# Patient Record
Sex: FEMALE | Race: OTHER | NOT HISPANIC OR LATINO | URBAN - METROPOLITAN AREA
[De-identification: names, ages, dates, MRNs, and addresses within clinical notes are randomized per-mention and may not be internally consistent; named-entity substitution may affect disease eponyms.]

---

## 2017-04-25 ENCOUNTER — INPATIENT (INPATIENT)
Facility: HOSPITAL | Age: 27
LOS: 2 days | Discharge: HOME | End: 2017-04-28
Attending: SURGERY | Admitting: SURGERY
Payer: SELF-PAY

## 2017-04-25 VITALS
RESPIRATION RATE: 19 BRPM | OXYGEN SATURATION: 100 % | SYSTOLIC BLOOD PRESSURE: 117 MMHG | TEMPERATURE: 98 F | DIASTOLIC BLOOD PRESSURE: 85 MMHG | HEART RATE: 106 BPM

## 2017-04-25 DIAGNOSIS — Z87.59 PERSONAL HISTORY OF OTHER COMPLICATIONS OF PREGNANCY, CHILDBIRTH AND THE PUERPERIUM: Chronic | ICD-10-CM

## 2017-04-25 DIAGNOSIS — K85.90 ACUTE PANCREATITIS WITHOUT NECROSIS OR INFECTION, UNSPECIFIED: ICD-10-CM

## 2017-04-25 LAB
ALBUMIN SERPL ELPH-MCNC: 3.4 G/DL — SIGNIFICANT CHANGE UP (ref 3.3–5)
ALBUMIN SERPL ELPH-MCNC: 4.5 G/DL — SIGNIFICANT CHANGE UP (ref 3.3–5)
ALP SERPL-CCNC: 72 U/L — SIGNIFICANT CHANGE UP (ref 40–120)
ALP SERPL-CCNC: 93 U/L — SIGNIFICANT CHANGE UP (ref 40–120)
ALT FLD-CCNC: 23 U/L — SIGNIFICANT CHANGE UP (ref 4–33)
ALT FLD-CCNC: 31 U/L — SIGNIFICANT CHANGE UP (ref 4–33)
APPEARANCE UR: SIGNIFICANT CHANGE UP
AST SERPL-CCNC: 38 U/L — HIGH (ref 4–32)
AST SERPL-CCNC: 48 U/L — HIGH (ref 4–32)
BASE EXCESS BLDV CALC-SCNC: 3.8 MMOL/L — SIGNIFICANT CHANGE UP
BASOPHILS # BLD AUTO: 0.02 K/UL — SIGNIFICANT CHANGE UP (ref 0–0.2)
BASOPHILS NFR BLD AUTO: 0.2 % — SIGNIFICANT CHANGE UP (ref 0–2)
BILIRUB SERPL-MCNC: 0.5 MG/DL — SIGNIFICANT CHANGE UP (ref 0.2–1.2)
BILIRUB SERPL-MCNC: 0.8 MG/DL — SIGNIFICANT CHANGE UP (ref 0.2–1.2)
BILIRUB UR-MCNC: SIGNIFICANT CHANGE UP
BLOOD GAS VENOUS - CREATININE: 0.57 MG/DL — SIGNIFICANT CHANGE UP (ref 0.5–1.3)
BLOOD UR QL VISUAL: HIGH
BUN SERPL-MCNC: 3 MG/DL — LOW (ref 7–23)
BUN SERPL-MCNC: 5 MG/DL — LOW (ref 7–23)
BUN SERPL-MCNC: 6 MG/DL — LOW (ref 7–23)
CALCIUM SERPL-MCNC: 10.3 MG/DL — SIGNIFICANT CHANGE UP (ref 8.4–10.5)
CALCIUM SERPL-MCNC: 7.6 MG/DL — LOW (ref 8.4–10.5)
CALCIUM SERPL-MCNC: 7.7 MG/DL — LOW (ref 8.4–10.5)
CHLORIDE BLDV-SCNC: 93 MMOL/L — LOW (ref 96–108)
CHLORIDE SERPL-SCNC: 88 MMOL/L — LOW (ref 98–107)
CHLORIDE SERPL-SCNC: 96 MMOL/L — LOW (ref 98–107)
CHLORIDE SERPL-SCNC: 97 MMOL/L — LOW (ref 98–107)
CO2 SERPL-SCNC: 22 MMOL/L — SIGNIFICANT CHANGE UP (ref 22–31)
CO2 SERPL-SCNC: 23 MMOL/L — SIGNIFICANT CHANGE UP (ref 22–31)
CO2 SERPL-SCNC: 24 MMOL/L — SIGNIFICANT CHANGE UP (ref 22–31)
COLOR SPEC: YELLOW — SIGNIFICANT CHANGE UP
CREAT SERPL-MCNC: 0.39 MG/DL — LOW (ref 0.5–1.3)
CREAT SERPL-MCNC: 0.56 MG/DL — SIGNIFICANT CHANGE UP (ref 0.5–1.3)
CREAT SERPL-MCNC: 0.72 MG/DL — SIGNIFICANT CHANGE UP (ref 0.5–1.3)
EOSINOPHIL # BLD AUTO: 0.01 K/UL — SIGNIFICANT CHANGE UP (ref 0–0.5)
EOSINOPHIL NFR BLD AUTO: 0.1 % — SIGNIFICANT CHANGE UP (ref 0–6)
GAS PNL BLDV: 136 MMOL/L — SIGNIFICANT CHANGE UP (ref 136–146)
GLUCOSE BLDV-MCNC: 125 — HIGH (ref 70–99)
GLUCOSE SERPL-MCNC: 113 MG/DL — HIGH (ref 70–99)
GLUCOSE SERPL-MCNC: 124 MG/DL — HIGH (ref 70–99)
GLUCOSE SERPL-MCNC: 90 MG/DL — SIGNIFICANT CHANGE UP (ref 70–99)
GLUCOSE UR-MCNC: SIGNIFICANT CHANGE UP
HCO3 BLDV-SCNC: 26 MMOL/L — SIGNIFICANT CHANGE UP (ref 20–27)
HCT VFR BLD CALC: 31.7 % — LOW (ref 34.5–45)
HCT VFR BLD CALC: 38.5 % — SIGNIFICANT CHANGE UP (ref 34.5–45)
HCT VFR BLDV CALC: 39.2 % — SIGNIFICANT CHANGE UP (ref 34.5–45)
HGB BLD-MCNC: 10.1 G/DL — LOW (ref 11.5–15.5)
HGB BLD-MCNC: 12.6 G/DL — SIGNIFICANT CHANGE UP (ref 11.5–15.5)
HGB BLDV-MCNC: 12.7 G/DL — SIGNIFICANT CHANGE UP (ref 11.5–15.5)
HYALINE CASTS # UR AUTO: >50 — SIGNIFICANT CHANGE UP (ref 0–?)
IMM GRANULOCYTES NFR BLD AUTO: 0.2 % — SIGNIFICANT CHANGE UP (ref 0–1.5)
INR BLD: 1.03 — SIGNIFICANT CHANGE UP (ref 0.88–1.17)
KETONES UR-MCNC: SIGNIFICANT CHANGE UP
LACTATE BLDV-MCNC: 2.2 MMOL/L — HIGH (ref 0.5–2)
LEUKOCYTE ESTERASE UR-ACNC: NEGATIVE — SIGNIFICANT CHANGE UP
LIDOCAIN IGE QN: 281.3 U/L — HIGH (ref 7–60)
LIDOCAIN IGE QN: 513.9 U/L — HIGH (ref 7–60)
LYMPHOCYTES # BLD AUTO: 1.53 K/UL — SIGNIFICANT CHANGE UP (ref 1–3.3)
LYMPHOCYTES # BLD AUTO: 14.7 % — SIGNIFICANT CHANGE UP (ref 13–44)
MAGNESIUM SERPL-MCNC: 1.6 MG/DL — SIGNIFICANT CHANGE UP (ref 1.6–2.6)
MCHC RBC-ENTMCNC: 31.3 PG — SIGNIFICANT CHANGE UP (ref 27–34)
MCHC RBC-ENTMCNC: 31.9 % — LOW (ref 32–36)
MCHC RBC-ENTMCNC: 32.1 PG — SIGNIFICANT CHANGE UP (ref 27–34)
MCHC RBC-ENTMCNC: 32.7 % — SIGNIFICANT CHANGE UP (ref 32–36)
MCV RBC AUTO: 98.1 FL — SIGNIFICANT CHANGE UP (ref 80–100)
MCV RBC AUTO: 98.2 FL — SIGNIFICANT CHANGE UP (ref 80–100)
MONOCYTES # BLD AUTO: 1.03 K/UL — HIGH (ref 0–0.9)
MONOCYTES NFR BLD AUTO: 9.9 % — SIGNIFICANT CHANGE UP (ref 2–14)
MUCOUS THREADS # UR AUTO: SIGNIFICANT CHANGE UP
NEUTROPHILS # BLD AUTO: 7.83 K/UL — HIGH (ref 1.8–7.4)
NEUTROPHILS NFR BLD AUTO: 74.9 % — SIGNIFICANT CHANGE UP (ref 43–77)
NITRITE UR-MCNC: NEGATIVE — SIGNIFICANT CHANGE UP
PCO2 BLDV: 46 MMHG — SIGNIFICANT CHANGE UP (ref 41–51)
PH BLDV: 7.41 PH — SIGNIFICANT CHANGE UP (ref 7.32–7.43)
PH UR: 6.5 — SIGNIFICANT CHANGE UP (ref 4.6–8)
PHOSPHATE SERPL-MCNC: 2.3 MG/DL — LOW (ref 2.5–4.5)
PLATELET # BLD AUTO: 147 K/UL — LOW (ref 150–400)
PLATELET # BLD AUTO: 178 K/UL — SIGNIFICANT CHANGE UP (ref 150–400)
PMV BLD: 11.3 FL — SIGNIFICANT CHANGE UP (ref 7–13)
PMV BLD: 12.1 FL — SIGNIFICANT CHANGE UP (ref 7–13)
PO2 BLDV: < 24 MMHG — LOW (ref 35–40)
POTASSIUM BLDV-SCNC: 2.8 MMOL/L — CRITICAL LOW (ref 3.4–4.5)
POTASSIUM SERPL-MCNC: 2.6 MMOL/L — CRITICAL LOW (ref 3.5–5.3)
POTASSIUM SERPL-MCNC: 3 MMOL/L — LOW (ref 3.5–5.3)
POTASSIUM SERPL-MCNC: 3.3 MMOL/L — LOW (ref 3.5–5.3)
POTASSIUM SERPL-SCNC: 2.6 MMOL/L — CRITICAL LOW (ref 3.5–5.3)
POTASSIUM SERPL-SCNC: 3 MMOL/L — LOW (ref 3.5–5.3)
POTASSIUM SERPL-SCNC: 3.3 MMOL/L — LOW (ref 3.5–5.3)
PROT SERPL-MCNC: 6.5 G/DL — SIGNIFICANT CHANGE UP (ref 6–8.3)
PROT SERPL-MCNC: 8.6 G/DL — HIGH (ref 6–8.3)
PROT UR-MCNC: >600 — SIGNIFICANT CHANGE UP
PROTHROM AB SERPL-ACNC: 11.5 SEC — SIGNIFICANT CHANGE UP (ref 9.8–13.1)
RBC # BLD: 3.23 M/UL — LOW (ref 3.8–5.2)
RBC # BLD: 3.92 M/UL — SIGNIFICANT CHANGE UP (ref 3.8–5.2)
RBC # FLD: 19.1 % — HIGH (ref 10.3–14.5)
RBC # FLD: 19.1 % — HIGH (ref 10.3–14.5)
RBC CASTS # UR COMP ASSIST: SIGNIFICANT CHANGE UP (ref 0–?)
SAO2 % BLDV: 33.8 % — LOW (ref 60–85)
SODIUM SERPL-SCNC: 136 MMOL/L — SIGNIFICANT CHANGE UP (ref 135–145)
SODIUM SERPL-SCNC: 136 MMOL/L — SIGNIFICANT CHANGE UP (ref 135–145)
SODIUM SERPL-SCNC: 138 MMOL/L — SIGNIFICANT CHANGE UP (ref 135–145)
SP GR SPEC: 1.04 — HIGH (ref 1–1.03)
SQUAMOUS # UR AUTO: SIGNIFICANT CHANGE UP
UROBILINOGEN FLD QL: 8 E.U. — HIGH (ref 0.1–0.2)
WBC # BLD: 10.44 K/UL — SIGNIFICANT CHANGE UP (ref 3.8–10.5)
WBC # BLD: 9.58 K/UL — SIGNIFICANT CHANGE UP (ref 3.8–10.5)
WBC # FLD AUTO: 10.44 K/UL — SIGNIFICANT CHANGE UP (ref 3.8–10.5)
WBC # FLD AUTO: 9.58 K/UL — SIGNIFICANT CHANGE UP (ref 3.8–10.5)
WBC UR QL: HIGH (ref 0–?)

## 2017-04-25 PROCEDURE — 74177 CT ABD & PELVIS W/CONTRAST: CPT | Mod: 26

## 2017-04-25 PROCEDURE — 99253 IP/OBS CNSLTJ NEW/EST LOW 45: CPT

## 2017-04-25 RX ORDER — ENOXAPARIN SODIUM 100 MG/ML
78 INJECTION SUBCUTANEOUS DAILY
Qty: 0 | Refills: 0 | Status: DISCONTINUED | OUTPATIENT
Start: 2017-04-25 | End: 2017-04-25

## 2017-04-25 RX ORDER — HYDROMORPHONE HYDROCHLORIDE 2 MG/ML
1 INJECTION INTRAMUSCULAR; INTRAVENOUS; SUBCUTANEOUS ONCE
Qty: 0 | Refills: 0 | Status: DISCONTINUED | OUTPATIENT
Start: 2017-04-25 | End: 2017-04-25

## 2017-04-25 RX ORDER — OXYCODONE HYDROCHLORIDE 5 MG/1
10 TABLET ORAL EVERY 4 HOURS
Qty: 0 | Refills: 0 | Status: DISCONTINUED | OUTPATIENT
Start: 2017-04-25 | End: 2017-04-28

## 2017-04-25 RX ORDER — MAGNESIUM SULFATE 500 MG/ML
2 VIAL (ML) INJECTION ONCE
Qty: 0 | Refills: 0 | Status: COMPLETED | OUTPATIENT
Start: 2017-04-25 | End: 2017-04-25

## 2017-04-25 RX ORDER — HYDROMORPHONE HYDROCHLORIDE 2 MG/ML
0.5 INJECTION INTRAMUSCULAR; INTRAVENOUS; SUBCUTANEOUS EVERY 6 HOURS
Qty: 0 | Refills: 0 | Status: DISCONTINUED | OUTPATIENT
Start: 2017-04-25 | End: 2017-04-27

## 2017-04-25 RX ORDER — SODIUM CHLORIDE 9 MG/ML
1000 INJECTION INTRAMUSCULAR; INTRAVENOUS; SUBCUTANEOUS ONCE
Qty: 0 | Refills: 0 | Status: COMPLETED | OUTPATIENT
Start: 2017-04-25 | End: 2017-04-25

## 2017-04-25 RX ORDER — ENOXAPARIN SODIUM 100 MG/ML
75 INJECTION SUBCUTANEOUS DAILY
Qty: 0 | Refills: 0 | Status: DISCONTINUED | OUTPATIENT
Start: 2017-04-25 | End: 2017-04-28

## 2017-04-25 RX ORDER — OXYCODONE HYDROCHLORIDE 5 MG/1
10 TABLET ORAL EVERY 4 HOURS
Qty: 0 | Refills: 0 | Status: DISCONTINUED | OUTPATIENT
Start: 2017-04-25 | End: 2017-04-25

## 2017-04-25 RX ORDER — POTASSIUM PHOSPHATE, MONOBASIC POTASSIUM PHOSPHATE, DIBASIC 236; 224 MG/ML; MG/ML
15 INJECTION, SOLUTION INTRAVENOUS ONCE
Qty: 0 | Refills: 0 | Status: COMPLETED | OUTPATIENT
Start: 2017-04-25 | End: 2017-04-25

## 2017-04-25 RX ORDER — POTASSIUM CHLORIDE 20 MEQ
10 PACKET (EA) ORAL
Qty: 0 | Refills: 0 | Status: COMPLETED | OUTPATIENT
Start: 2017-04-25 | End: 2017-04-25

## 2017-04-25 RX ORDER — SODIUM CHLORIDE 9 MG/ML
1000 INJECTION, SOLUTION INTRAVENOUS
Qty: 0 | Refills: 0 | Status: DISCONTINUED | OUTPATIENT
Start: 2017-04-25 | End: 2017-04-27

## 2017-04-25 RX ORDER — OXYCODONE HYDROCHLORIDE 5 MG/1
5 TABLET ORAL EVERY 4 HOURS
Qty: 0 | Refills: 0 | Status: DISCONTINUED | OUTPATIENT
Start: 2017-04-25 | End: 2017-04-25

## 2017-04-25 RX ORDER — OXYCODONE HYDROCHLORIDE 5 MG/1
5 TABLET ORAL EVERY 4 HOURS
Qty: 0 | Refills: 0 | Status: DISCONTINUED | OUTPATIENT
Start: 2017-04-25 | End: 2017-04-28

## 2017-04-25 RX ORDER — ONDANSETRON 8 MG/1
4 TABLET, FILM COATED ORAL ONCE
Qty: 0 | Refills: 0 | Status: COMPLETED | OUTPATIENT
Start: 2017-04-25 | End: 2017-04-25

## 2017-04-25 RX ORDER — ENOXAPARIN SODIUM 100 MG/ML
75 INJECTION SUBCUTANEOUS DAILY
Qty: 0 | Refills: 0 | Status: DISCONTINUED | OUTPATIENT
Start: 2017-04-25 | End: 2017-04-25

## 2017-04-25 RX ADMIN — HYDROMORPHONE HYDROCHLORIDE 1 MILLIGRAM(S): 2 INJECTION INTRAMUSCULAR; INTRAVENOUS; SUBCUTANEOUS at 00:00

## 2017-04-25 RX ADMIN — HYDROMORPHONE HYDROCHLORIDE 1 MILLIGRAM(S): 2 INJECTION INTRAMUSCULAR; INTRAVENOUS; SUBCUTANEOUS at 02:51

## 2017-04-25 RX ADMIN — HYDROMORPHONE HYDROCHLORIDE 1 MILLIGRAM(S): 2 INJECTION INTRAMUSCULAR; INTRAVENOUS; SUBCUTANEOUS at 03:05

## 2017-04-25 RX ADMIN — OXYCODONE HYDROCHLORIDE 5 MILLIGRAM(S): 5 TABLET ORAL at 15:45

## 2017-04-25 RX ADMIN — OXYCODONE HYDROCHLORIDE 5 MILLIGRAM(S): 5 TABLET ORAL at 15:21

## 2017-04-25 RX ADMIN — SODIUM CHLORIDE 1000 MILLILITER(S): 9 INJECTION INTRAMUSCULAR; INTRAVENOUS; SUBCUTANEOUS at 03:05

## 2017-04-25 RX ADMIN — HYDROMORPHONE HYDROCHLORIDE 0.5 MILLIGRAM(S): 2 INJECTION INTRAMUSCULAR; INTRAVENOUS; SUBCUTANEOUS at 12:27

## 2017-04-25 RX ADMIN — Medication 100 MILLIEQUIVALENT(S): at 12:12

## 2017-04-25 RX ADMIN — Medication 100 MILLIEQUIVALENT(S): at 21:19

## 2017-04-25 RX ADMIN — ONDANSETRON 4 MILLIGRAM(S): 8 TABLET, FILM COATED ORAL at 02:51

## 2017-04-25 RX ADMIN — ENOXAPARIN SODIUM 75 MILLIGRAM(S): 100 INJECTION SUBCUTANEOUS at 12:25

## 2017-04-25 RX ADMIN — SODIUM CHLORIDE 1000 MILLILITER(S): 9 INJECTION INTRAMUSCULAR; INTRAVENOUS; SUBCUTANEOUS at 03:52

## 2017-04-25 RX ADMIN — HYDROMORPHONE HYDROCHLORIDE 1 MILLIGRAM(S): 2 INJECTION INTRAMUSCULAR; INTRAVENOUS; SUBCUTANEOUS at 06:51

## 2017-04-25 RX ADMIN — Medication 100 MILLIEQUIVALENT(S): at 06:51

## 2017-04-25 RX ADMIN — Medication 100 MILLIEQUIVALENT(S): at 16:49

## 2017-04-25 RX ADMIN — OXYCODONE HYDROCHLORIDE 10 MILLIGRAM(S): 5 TABLET ORAL at 19:10

## 2017-04-25 RX ADMIN — HYDROMORPHONE HYDROCHLORIDE 0.5 MILLIGRAM(S): 2 INJECTION INTRAMUSCULAR; INTRAVENOUS; SUBCUTANEOUS at 12:13

## 2017-04-25 RX ADMIN — Medication 100 MILLIEQUIVALENT(S): at 23:52

## 2017-04-25 RX ADMIN — Medication 100 MILLIEQUIVALENT(S): at 22:34

## 2017-04-25 RX ADMIN — Medication 100 MILLIEQUIVALENT(S): at 04:51

## 2017-04-25 RX ADMIN — POTASSIUM PHOSPHATE, MONOBASIC POTASSIUM PHOSPHATE, DIBASIC 62.5 MILLIMOLE(S): 236; 224 INJECTION, SOLUTION INTRAVENOUS at 11:23

## 2017-04-25 RX ADMIN — Medication 50 GRAM(S): at 09:51

## 2017-04-25 RX ADMIN — Medication 100 MILLIEQUIVALENT(S): at 15:09

## 2017-04-25 RX ADMIN — OXYCODONE HYDROCHLORIDE 10 MILLIGRAM(S): 5 TABLET ORAL at 18:40

## 2017-04-25 RX ADMIN — Medication 100 MILLIEQUIVALENT(S): at 03:22

## 2017-04-25 RX ADMIN — SODIUM CHLORIDE 125 MILLILITER(S): 9 INJECTION, SOLUTION INTRAVENOUS at 11:25

## 2017-04-25 NOTE — ED PROVIDER NOTE - PMH
Alcohol abuse, daily use    Ectopic pregnancy Alcohol abuse, daily use    Ectopic pregnancy    Pancreatitis

## 2017-04-25 NOTE — ED PROVIDER NOTE - OBJECTIVE STATEMENT
att27 y/o f with h/o pancreatitis , h/o etoh abuse, ectopic pregnancy, kidney stones, presents with back pain x 2 days and then a separate abd pain, nausea/vomiting since yesterday. Feels like her pancreatitis in the past. No dysuria. R breast discomfort 4 days ago. Pt had + etoh this week, 3 days straight. LMP 3 weeks ago. No fever, no diarrhea. No cp, no sob. No dysuria. h/o etoh in the past, over 3 yrs ago. att25 y/o f with h/o pancreatitis , h/o etoh abuse, ectopic pregnancy, kidney stones, presents with back pain x 2 days and then a separate abd pain, nausea/vomiting since yesterday. Feels like her pancreatitis in the past. No dysuria. R breast discomfort 4 days ago. Pt had + etoh this week, 3 days straight. LMP 3 weeks ago. No fever, no diarrhea. No cp, no sob. No dysuria. h/o etoh in the past, over 3 yrs ago.   Reviewed chart, necrotizing pancreatitis with peripancreatic collections on rp ct.

## 2017-04-25 NOTE — ED ADULT NURSE REASSESSMENT NOTE - NS ED NURSE REASSESS COMMENT FT1
Pt remains A&Ox3 c/o generalized abdominal pain- pt medicated per MD orders. Denies dizziness, lightheadedness, n/v/d, fever, chills, SOB. Pt appears comfortably in stretcher, respirations even and unlabored, nad noted at this time. Awaiting surgery consult. Will monitor patient closely.

## 2017-04-25 NOTE — ED ADULT NURSE REASSESSMENT NOTE - NS ED NURSE REASSESS COMMENT FT1
Pt remains A&Ox3, denies abdominal pain/discomfort, n/v/d, dizziness, lightheadedness, fever, chills at this time. Second 20G IV placed in L AC, repeat blood work drawn and sent and CIWA score 0 as noted in flow sheet. Pt states last drink was Sunday at 5am, states she does not drink daily and does not go through withdrawal. Pt appears comfortably in stretcher, respirations even and unlabored, nad noted at this time. Will monitor patient closely.

## 2017-04-25 NOTE — ED PROVIDER NOTE - MEDICAL DECISION MAKING DETAILS
att25 y/o f with h/o pancreatitis , h/o etoh abuse, ectopic pregnancy, kidney stones, presents with back pain x 2 days and then a separate abd pain, nausea/vomiting since yesterday. Feels like her pancreatitis in the past. No dysuria. R breast discomfort 4 days ago. Pt had + etoh this week, 3 days straight. LMP 3 weeks ago. No fever, no diarrhea. No cp, no sob. No dysuria. h/o etoh in the past, over 3 yrs ago. Reviewed chart, necrotizing pancreatitis with peripancreatic collections on rp ct. Exam as above. Plan for pain control, ru pancreatitis, ct with h/o and reassessment. Likely admission. Likely etoh related.

## 2017-04-25 NOTE — ED ADULT NURSE NOTE - OBJECTIVE STATEMENT
Pt received to room1 A&Ox3 c/o back pain X2days, generalized abdominal pain with n/v since yesterday and R breast pain Xweek. Pt states back pain "all along my spinal cord." Arrives with an icy hot patch on back but states no relief with patch. Pt states she started having abdominal pain since yesterday morning accompanied with n/v X10, unable to tolerate anything PO. Cannot identify any alleviating or worsening factors for back or abdominal pain. Last BM thee days ago- states she usually has a BM daily. Has been taking Motrin at home for pain with no relief. Denies urinary symptoms, bloody BM/emesis, chest pain, dizziness, fever, chills, SOB at this time. PMH pancreatitis. 20G IV placed in R AC, labs sent and awaiting MD rivera. Will monitor patient closely.

## 2017-04-25 NOTE — H&P ADULT. - HISTORY OF PRESENT ILLNESS
26F with history of ectopic pregnancy s/p R salpingectomy , ETOH abuse, necrotizing pancreatitis in  (admitted to Dr. Oscar) presenting with back pain and abdominal pain. Back pain began 4 days prior to admission, after which the patient was heavily drinking for 2 friends' birthdays. Pain continued and radiated to the abdomen today, at which point it felt like the pain she previously had with pancreatitis. She had nausea/emesis this morning. No fevers/chills.    In  patient was admitted from -  first to the medical service and then transferred to the surgical service under Dr. Oscar for treatment of necrotizing pancreatitis. She did not require surgery during that admission. She was found to have bilateral pleural effusions and was intubated for respiratory distress from 2013-2013. She received enteric feeds via a nasoduodenal feeding tube and was later advanced to a regular diet and discharged home.    The patient has a medical/surgical history of an ectopic pregnancy in  s/p right salpingectomy. She takes Aleve as needed for abdominal pain during her period. LMP 3 weeks ago, irregular (she has always had irregular periods but has never been on medications such as OCP for it). She has no known allergies and no known medical problems that run in her family.    The patient lives at home with her mother and son who was [place] . Her father  after which she has been a heavy drinker, with multiple attempts to quit. She had previous symptoms of alcohol withdrawal during her last admission. She currently drinks 3-4 drinks containing vodka every week. She occasionally smokes marijuana but denies cigarette smoking.

## 2017-04-25 NOTE — ED ADULT TRIAGE NOTE - CHIEF COMPLAINT QUOTE
Pt c/o abdominal pain and back pain starting yesterday.  Pt endorses nausea/vomiting, dry heaving in triage.  Denies any urinary symptoms.  Pt reports hx of pancreatitis, states feels similar.  Pt also c/o R breast pain starting a few days ago.  PMHx:  pancreatitis

## 2017-04-25 NOTE — ED PROVIDER NOTE - PROGRESS NOTE DETAILS
Pt states symptoms improved. CT with cannot exclude necrosis, small peripancreatic fluid collections near the tail of the pancreas. thrombosed splenic vein, in past, pt states she was on heparin and d/c on heparin for 2 days and no further AC. C/s surgery.   Discussed the need for cessation of etoh use, severity of disease. Pt states she understands.

## 2017-04-25 NOTE — H&P ADULT. - ASSESSMENT
26F with alcoholic pancreatitis c/b peripancreatic fluid collections and splenic vein thrombosis  -Admit to B team surgery under Dr. Ward  -NPO  -IVF with multivitamins given ETOH abuse  -abdominal exams  -[facility] protocol to monitor for alcohol withdrawal signs/symptoms  -anticoagulation for splenic vein thrombosis  -social work consult for alcohol abuse

## 2017-04-25 NOTE — ED PROVIDER NOTE - CONSTITUTIONAL, MLM
normal... nontoxic. uncomfortable secondary to pain. well nourished, awake, alert, oriented to person, place, time/situation

## 2017-04-26 LAB
ALBUMIN SERPL ELPH-MCNC: 3.2 G/DL — LOW (ref 3.3–5)
ALP SERPL-CCNC: 68 U/L — SIGNIFICANT CHANGE UP (ref 40–120)
ALT FLD-CCNC: 18 U/L — SIGNIFICANT CHANGE UP (ref 4–33)
APTT BLD: 25.5 SEC — LOW (ref 27.5–37.4)
AST SERPL-CCNC: 29 U/L — SIGNIFICANT CHANGE UP (ref 4–32)
BILIRUB SERPL-MCNC: 0.4 MG/DL — SIGNIFICANT CHANGE UP (ref 0.2–1.2)
BUN SERPL-MCNC: 2 MG/DL — LOW (ref 7–23)
BUN SERPL-MCNC: 3 MG/DL — LOW (ref 7–23)
BUN SERPL-MCNC: 3 MG/DL — LOW (ref 7–23)
CALCIUM SERPL-MCNC: 7.6 MG/DL — LOW (ref 8.4–10.5)
CALCIUM SERPL-MCNC: 8.2 MG/DL — LOW (ref 8.4–10.5)
CALCIUM SERPL-MCNC: 8.6 MG/DL — SIGNIFICANT CHANGE UP (ref 8.4–10.5)
CHLORIDE SERPL-SCNC: 99 MMOL/L — SIGNIFICANT CHANGE UP (ref 98–107)
CO2 SERPL-SCNC: 23 MMOL/L — SIGNIFICANT CHANGE UP (ref 22–31)
CO2 SERPL-SCNC: 23 MMOL/L — SIGNIFICANT CHANGE UP (ref 22–31)
CO2 SERPL-SCNC: 28 MMOL/L — SIGNIFICANT CHANGE UP (ref 22–31)
CREAT SERPL-MCNC: 0.39 MG/DL — LOW (ref 0.5–1.3)
CREAT SERPL-MCNC: 0.42 MG/DL — LOW (ref 0.5–1.3)
CREAT SERPL-MCNC: 0.51 MG/DL — SIGNIFICANT CHANGE UP (ref 0.5–1.3)
GLUCOSE SERPL-MCNC: 113 MG/DL — HIGH (ref 70–99)
GLUCOSE SERPL-MCNC: 79 MG/DL — SIGNIFICANT CHANGE UP (ref 70–99)
GLUCOSE SERPL-MCNC: 88 MG/DL — SIGNIFICANT CHANGE UP (ref 70–99)
HCT VFR BLD CALC: 32.7 % — LOW (ref 34.5–45)
HGB BLD-MCNC: 10.4 G/DL — LOW (ref 11.5–15.5)
INR BLD: 1.03 — SIGNIFICANT CHANGE UP (ref 0.88–1.17)
LIDOCAIN IGE QN: 98.8 U/L — HIGH (ref 7–60)
MAGNESIUM SERPL-MCNC: 1.8 MG/DL — SIGNIFICANT CHANGE UP (ref 1.6–2.6)
MAGNESIUM SERPL-MCNC: 2.1 MG/DL — SIGNIFICANT CHANGE UP (ref 1.6–2.6)
MCHC RBC-ENTMCNC: 31.7 PG — SIGNIFICANT CHANGE UP (ref 27–34)
MCHC RBC-ENTMCNC: 31.8 % — LOW (ref 32–36)
MCV RBC AUTO: 99.7 FL — SIGNIFICANT CHANGE UP (ref 80–100)
PHOSPHATE SERPL-MCNC: 1.4 MG/DL — LOW (ref 2.5–4.5)
PHOSPHATE SERPL-MCNC: 1.7 MG/DL — LOW (ref 2.5–4.5)
PLATELET # BLD AUTO: 167 K/UL — SIGNIFICANT CHANGE UP (ref 150–400)
PMV BLD: 11.5 FL — SIGNIFICANT CHANGE UP (ref 7–13)
POTASSIUM SERPL-MCNC: 3 MMOL/L — LOW (ref 3.5–5.3)
POTASSIUM SERPL-MCNC: 3.1 MMOL/L — LOW (ref 3.5–5.3)
POTASSIUM SERPL-MCNC: 3.5 MMOL/L — SIGNIFICANT CHANGE UP (ref 3.5–5.3)
POTASSIUM SERPL-SCNC: 3 MMOL/L — LOW (ref 3.5–5.3)
POTASSIUM SERPL-SCNC: 3.1 MMOL/L — LOW (ref 3.5–5.3)
POTASSIUM SERPL-SCNC: 3.5 MMOL/L — SIGNIFICANT CHANGE UP (ref 3.5–5.3)
PROT SERPL-MCNC: 6.5 G/DL — SIGNIFICANT CHANGE UP (ref 6–8.3)
PROTHROM AB SERPL-ACNC: 11.5 SEC — SIGNIFICANT CHANGE UP (ref 9.8–13.1)
RBC # BLD: 3.28 M/UL — LOW (ref 3.8–5.2)
RBC # FLD: 19.2 % — HIGH (ref 10.3–14.5)
SODIUM SERPL-SCNC: 138 MMOL/L — SIGNIFICANT CHANGE UP (ref 135–145)
SODIUM SERPL-SCNC: 138 MMOL/L — SIGNIFICANT CHANGE UP (ref 135–145)
SODIUM SERPL-SCNC: 139 MMOL/L — SIGNIFICANT CHANGE UP (ref 135–145)
WBC # BLD: 8.14 K/UL — SIGNIFICANT CHANGE UP (ref 3.8–10.5)
WBC # FLD AUTO: 8.14 K/UL — SIGNIFICANT CHANGE UP (ref 3.8–10.5)

## 2017-04-26 PROCEDURE — 99232 SBSQ HOSP IP/OBS MODERATE 35: CPT

## 2017-04-26 RX ORDER — POTASSIUM CHLORIDE 20 MEQ
10 PACKET (EA) ORAL
Qty: 0 | Refills: 0 | Status: COMPLETED | OUTPATIENT
Start: 2017-04-26 | End: 2017-04-26

## 2017-04-26 RX ORDER — POTASSIUM PHOSPHATE, MONOBASIC POTASSIUM PHOSPHATE, DIBASIC 236; 224 MG/ML; MG/ML
15 INJECTION, SOLUTION INTRAVENOUS ONCE
Qty: 0 | Refills: 0 | Status: COMPLETED | OUTPATIENT
Start: 2017-04-26 | End: 2017-04-26

## 2017-04-26 RX ADMIN — OXYCODONE HYDROCHLORIDE 10 MILLIGRAM(S): 5 TABLET ORAL at 19:22

## 2017-04-26 RX ADMIN — OXYCODONE HYDROCHLORIDE 10 MILLIGRAM(S): 5 TABLET ORAL at 01:45

## 2017-04-26 RX ADMIN — OXYCODONE HYDROCHLORIDE 10 MILLIGRAM(S): 5 TABLET ORAL at 19:44

## 2017-04-26 RX ADMIN — OXYCODONE HYDROCHLORIDE 10 MILLIGRAM(S): 5 TABLET ORAL at 02:15

## 2017-04-26 RX ADMIN — OXYCODONE HYDROCHLORIDE 10 MILLIGRAM(S): 5 TABLET ORAL at 11:15

## 2017-04-26 RX ADMIN — Medication 100 MILLIEQUIVALENT(S): at 10:51

## 2017-04-26 RX ADMIN — POTASSIUM PHOSPHATE, MONOBASIC POTASSIUM PHOSPHATE, DIBASIC 62.5 MILLIMOLE(S): 236; 224 INJECTION, SOLUTION INTRAVENOUS at 12:16

## 2017-04-26 RX ADMIN — Medication 100 MILLIEQUIVALENT(S): at 16:45

## 2017-04-26 RX ADMIN — Medication 100 MILLIEQUIVALENT(S): at 13:03

## 2017-04-26 RX ADMIN — OXYCODONE HYDROCHLORIDE 10 MILLIGRAM(S): 5 TABLET ORAL at 10:50

## 2017-04-26 RX ADMIN — ENOXAPARIN SODIUM 75 MILLIGRAM(S): 100 INJECTION SUBCUTANEOUS at 12:16

## 2017-04-27 ENCOUNTER — TRANSCRIPTION ENCOUNTER (OUTPATIENT)
Age: 27
End: 2017-04-27

## 2017-04-27 LAB
ALBUMIN SERPL ELPH-MCNC: 3 G/DL — LOW (ref 3.3–5)
ALP SERPL-CCNC: 63 U/L — SIGNIFICANT CHANGE UP (ref 40–120)
ALT FLD-CCNC: 16 U/L — SIGNIFICANT CHANGE UP (ref 4–33)
AMYLASE P1 CFR SERPL: 79 U/L — SIGNIFICANT CHANGE UP (ref 25–125)
AST SERPL-CCNC: 27 U/L — SIGNIFICANT CHANGE UP (ref 4–32)
BILIRUB SERPL-MCNC: 0.3 MG/DL — SIGNIFICANT CHANGE UP (ref 0.2–1.2)
BUN SERPL-MCNC: 2 MG/DL — LOW (ref 7–23)
CALCIUM SERPL-MCNC: 8.7 MG/DL — SIGNIFICANT CHANGE UP (ref 8.4–10.5)
CHLORIDE SERPL-SCNC: 104 MMOL/L — SIGNIFICANT CHANGE UP (ref 98–107)
CO2 SERPL-SCNC: 26 MMOL/L — SIGNIFICANT CHANGE UP (ref 22–31)
CREAT SERPL-MCNC: 0.43 MG/DL — LOW (ref 0.5–1.3)
GLUCOSE SERPL-MCNC: 111 MG/DL — HIGH (ref 70–99)
HCT VFR BLD CALC: 34.1 % — LOW (ref 34.5–45)
HGB BLD-MCNC: 10.8 G/DL — LOW (ref 11.5–15.5)
LIDOCAIN IGE QN: 121.2 U/L — HIGH (ref 7–60)
MCHC RBC-ENTMCNC: 31.7 % — LOW (ref 32–36)
MCHC RBC-ENTMCNC: 31.8 PG — SIGNIFICANT CHANGE UP (ref 27–34)
MCV RBC AUTO: 100.3 FL — HIGH (ref 80–100)
PLATELET # BLD AUTO: 196 K/UL — SIGNIFICANT CHANGE UP (ref 150–400)
PMV BLD: 11.3 FL — SIGNIFICANT CHANGE UP (ref 7–13)
POTASSIUM SERPL-MCNC: 4.5 MMOL/L — SIGNIFICANT CHANGE UP (ref 3.5–5.3)
POTASSIUM SERPL-SCNC: 4.5 MMOL/L — SIGNIFICANT CHANGE UP (ref 3.5–5.3)
PROT SERPL-MCNC: 6.2 G/DL — SIGNIFICANT CHANGE UP (ref 6–8.3)
RBC # BLD: 3.4 M/UL — LOW (ref 3.8–5.2)
RBC # FLD: 19.1 % — HIGH (ref 10.3–14.5)
SODIUM SERPL-SCNC: 140 MMOL/L — SIGNIFICANT CHANGE UP (ref 135–145)
WBC # BLD: 6.34 K/UL — SIGNIFICANT CHANGE UP (ref 3.8–10.5)
WBC # FLD AUTO: 6.34 K/UL — SIGNIFICANT CHANGE UP (ref 3.8–10.5)

## 2017-04-27 RX ORDER — OXYCODONE HYDROCHLORIDE 5 MG/1
1 TABLET ORAL
Qty: 20 | Refills: 0 | OUTPATIENT
Start: 2017-04-27

## 2017-04-27 RX ORDER — APIXABAN 2.5 MG/1
1 TABLET, FILM COATED ORAL
Qty: 60 | Refills: 0 | OUTPATIENT
Start: 2017-04-27 | End: 2017-05-27

## 2017-04-27 RX ORDER — OXYCODONE HYDROCHLORIDE 5 MG/1
1 TABLET ORAL
Qty: 0 | Refills: 0 | COMMUNITY
Start: 2017-04-27

## 2017-04-27 RX ORDER — ENOXAPARIN SODIUM 100 MG/ML
75 INJECTION SUBCUTANEOUS
Qty: 525 | Refills: 0 | OUTPATIENT
Start: 2017-04-27 | End: 2017-05-04

## 2017-04-27 RX ORDER — WARFARIN SODIUM 2.5 MG/1
5 TABLET ORAL ONCE
Qty: 0 | Refills: 0 | Status: COMPLETED | OUTPATIENT
Start: 2017-04-27 | End: 2017-04-27

## 2017-04-27 RX ORDER — POTASSIUM PHOSPHATE, MONOBASIC POTASSIUM PHOSPHATE, DIBASIC 236; 224 MG/ML; MG/ML
15 INJECTION, SOLUTION INTRAVENOUS ONCE
Qty: 0 | Refills: 0 | Status: COMPLETED | OUTPATIENT
Start: 2017-04-27 | End: 2017-04-27

## 2017-04-27 RX ORDER — POTASSIUM CHLORIDE 20 MEQ
10 PACKET (EA) ORAL
Qty: 0 | Refills: 0 | Status: COMPLETED | OUTPATIENT
Start: 2017-04-27 | End: 2017-04-27

## 2017-04-27 RX ORDER — POTASSIUM CHLORIDE 20 MEQ
40 PACKET (EA) ORAL ONCE
Qty: 0 | Refills: 0 | Status: COMPLETED | OUTPATIENT
Start: 2017-04-27 | End: 2017-04-27

## 2017-04-27 RX ORDER — MAGNESIUM SULFATE 500 MG/ML
2 VIAL (ML) INJECTION ONCE
Qty: 0 | Refills: 0 | Status: COMPLETED | OUTPATIENT
Start: 2017-04-27 | End: 2017-04-27

## 2017-04-27 RX ORDER — WARFARIN SODIUM 2.5 MG/1
1 TABLET ORAL
Qty: 0 | Refills: 0 | COMMUNITY
Start: 2017-04-27

## 2017-04-27 RX ADMIN — OXYCODONE HYDROCHLORIDE 10 MILLIGRAM(S): 5 TABLET ORAL at 01:15

## 2017-04-27 RX ADMIN — Medication 40 MILLIEQUIVALENT(S): at 00:58

## 2017-04-27 RX ADMIN — OXYCODONE HYDROCHLORIDE 10 MILLIGRAM(S): 5 TABLET ORAL at 11:50

## 2017-04-27 RX ADMIN — Medication 100 MILLIEQUIVALENT(S): at 02:07

## 2017-04-27 RX ADMIN — OXYCODONE HYDROCHLORIDE 10 MILLIGRAM(S): 5 TABLET ORAL at 22:39

## 2017-04-27 RX ADMIN — ENOXAPARIN SODIUM 75 MILLIGRAM(S): 100 INJECTION SUBCUTANEOUS at 11:20

## 2017-04-27 RX ADMIN — Medication 100 MILLIEQUIVALENT(S): at 03:07

## 2017-04-27 RX ADMIN — OXYCODONE HYDROCHLORIDE 10 MILLIGRAM(S): 5 TABLET ORAL at 11:20

## 2017-04-27 RX ADMIN — OXYCODONE HYDROCHLORIDE 10 MILLIGRAM(S): 5 TABLET ORAL at 01:45

## 2017-04-27 RX ADMIN — Medication 50 GRAM(S): at 00:58

## 2017-04-27 RX ADMIN — OXYCODONE HYDROCHLORIDE 10 MILLIGRAM(S): 5 TABLET ORAL at 23:19

## 2017-04-27 RX ADMIN — WARFARIN SODIUM 5 MILLIGRAM(S): 2.5 TABLET ORAL at 22:35

## 2017-04-27 RX ADMIN — POTASSIUM PHOSPHATE, MONOBASIC POTASSIUM PHOSPHATE, DIBASIC 62.5 MILLIMOLE(S): 236; 224 INJECTION, SOLUTION INTRAVENOUS at 02:00

## 2017-04-27 RX ADMIN — Medication 100 MILLIEQUIVALENT(S): at 00:58

## 2017-04-27 NOTE — DISCHARGE NOTE ADULT - CARE PROVIDER_API CALL
Jeferson Oscar), Surgery  [address] [place] [address]  Phone: [phone]  Fax: [phone] Cem Ward), Surgery  [address] [place] [address]  Phone: [phone]  Fax: [phone]

## 2017-04-27 NOTE — DISCHARGE NOTE ADULT - CARE PLAN
Principal Discharge DX:	Acute pancreatitis  Goal:	Obtain proper follow up  Instructions for follow-up, activity and diet:	Please follow up with Dr. Ward in two weeks. Refrain from drinking alcohol.   Return to the hospital if you notice high fevers, abdominal pain, or food intolerance. Principal Discharge DX:	Acute pancreatitis  Goal:	Obtain proper follow up  Instructions for follow-up, activity and diet:	Please follow up with Dr. Ward in two weeks. Refrain from drinking alcohol.   Return to the hospital if you notice high fevers, abdominal pain, or food intolerance.  Secondary Diagnosis:	Splenic vein thrombosis  Goal:	You were started on anticoagulation in the hospital  Instructions for follow-up, activity and diet:	Please follow up with Primary medical doctor Dr. Augustin Sanabria for INR checks. You are at high risk for bleeding/brusing.  If your notice easy bleeding or bruising, please follow up with your primary medical doctor. Principal Discharge DX:	Acute pancreatitis  Goal:	Obtain proper follow up  Instructions for follow-up, activity and diet:	Please follow up with Dr. Ward in two weeks. Refrain from drinking alcohol.   Return to the hospital if you notice high fevers, abdominal pain, or food intolerance.  Secondary Diagnosis:	Splenic vein thrombosis  Goal:	You were started on anticoagulation in the hospital  Instructions for follow-up, activity and diet:	Please follow up with Primary medical doctor Dr. Augustin Sanabria for INR checks.  Please follow up with Dr. Sanbaria tomorrow 8/29/17 for INR check and for further instructions for your lovenox dosing.  You have been given a prescription for 3 days for Lovenox and Coumadin after which point you will need a new prescription on Monday for further dosing.  You are at high risk for bleeding/brusing.  If your notice easy bleeding or bruising, please follow up with your primary medical doctor.  If you have any major bleeding please go to your nearest emergengy room.  You had a few drops of blood from your right ear while in the hospital.  You were seen by the Ear Nose And Throat team who recommended Ofloxacin drops.  You did not want a prescription for the drops at this time and said that you will follow up with your Primary Care Doctor tomorrow in the office about this. Principal Discharge DX:	Acute pancreatitis  Goal:	Obtain proper follow up  Instructions for follow-up, activity and diet:	Please follow up with Dr. Ward in two weeks. Refrain from drinking alcohol.   Return to the hospital if you notice high fevers, abdominal pain, or food intolerance.  Secondary Diagnosis:	Splenic vein thrombosis  Goal:	You were started on anticoagulation in the hospital  Instructions for follow-up, activity and diet:	Please follow up with Primary medical doctor Dr. Augustin Sanabria for INR checks.  Please follow up with Dr. Sanabria tomorrow 8/29/17 for INR check and for further instructions for your lovenox dosing.  You have been given a prescription for 3 days for Lovenox and Coumadin after which point you will need a new prescription on Monday for further dosing.  You are at high risk for bleeding/brusing.  If your notice easy bleeding or bruising, please follow up with your primary medical doctor.  If you have any major bleeding please go to your nearest emergengy room.  You had a few drops of blood from your right ear while in the hospital.  You were seen by the Ear Nose And Throat team who recommended Ofloxacin drops.  You did not want a prescription for the drops at this time and said that you will follow up with your Primary Care Doctor tomorrow in the office about this. Principal Discharge DX:	Acute pancreatitis  Goal:	Obtain proper follow up  Instructions for follow-up, activity and diet:	Please follow up with Dr. Ward in two weeks. Refrain from drinking alcohol.   Return to the hospital if you notice high fevers, abdominal pain, or food intolerance.  Secondary Diagnosis:	Splenic vein thrombosis  Goal:	You were started on anticoagulation in the hospital  Instructions for follow-up, activity and diet:	Please follow up with Primary medical doctor Dr. Augustin Sanabria for INR checks.  Please follow up with Dr. Sanabria tomorrow 8/29/17 for INR check and for further instructions for your lovenox and coumadin dosing.  You have been given a prescription for 3 days for Lovenox and Coumadin after which point you will need a new prescription on Monday from Dr. Sanabria for further dosing.  You are at high risk for bleeding/brusing.  If your notice easy bleeding or bruising, please follow up with your primary medical doctor.  If you have any major bleeding please go to your nearest emergengy room.  You had a few drops of blood from your right ear while in the hospital.  You were seen by the Ear Nose And Throat team who did an exam and recommended Ofloxacin drops.  Please put 4 drops in your right ear twice daily for 5 days and then follow up with your Dr. Sanabria about that as well.  Do not use a q tip.

## 2017-04-27 NOTE — DISCHARGE NOTE ADULT - MEDICATION SUMMARY - MEDICATIONS TO TAKE
I will START or STAY ON the medications listed below when I get home from the hospital:    Aleve  --  by mouth , As Needed  -- Indication: For Pain    oxyCODONE 5 mg oral tablet  -- 1 tab(s) by mouth every 4 hours, As needed, Moderate Pain (4 - 6) MDD:6  -- Indication: For Pain    apixaban 2.5 mg oral tablet  -- 1 tab(s) by mouth 2 times a day  -- Check with your doctor before becoming pregnant.  It is very important that you take or use this exactly as directed.  Do not skip doses or discontinue unless directed by your doctor.  Obtain medical advice before taking any non-prescription drugs as some may affect the action of this medication.    -- Indication: For Splenic vein thrombosis I will START or STAY ON the medications listed below when I get home from the hospital:    enoxaparin  -- 75 milligram(s) subcutaneous once a day for 3 days  -- Indication: For Splenic vein thrombosis Anticoagulation (Blood Thinner)    warfarin 5 mg oral tablet  -- 5 milligram(s) by mouth once a day for 3 days (check INR with Primary Care Doctor on Saturday 4/29 and Monday 5/1 for dosing changes).  -- Indication: For Splenic vein thrombosis Anticoagulation (Blood Thinner) I will START or STAY ON the medications listed below when I get home from the hospital:    enoxaparin  -- 75 milligram(s) subcutaneous once a day for 3 days  -- Indication: For Splenic vein thrombosis Anticoagulation (Blood Thinner)    warfarin 5 mg oral tablet  -- 5 milligram(s) by mouth once a day for 3 days (check INR with Primary Care Doctor on Saturday 4/29 and Monday 5/1 for dosing changes).  -- Indication: For Splenic vein thrombosis Anticoagulation (Blood Thinner)    ofloxacin 0.3% ophthalmic solution  -- 4 drop(s) in the right ear 2 times a day for 5 days  -- Indication: For Right Ear Bleeding

## 2017-04-27 NOTE — DISCHARGE NOTE ADULT - MEDICATION SUMMARY - MEDICATIONS TO STOP TAKING
I will STOP taking the medications listed below when I get home from the hospital:  None I will STOP taking the medications listed below when I get home from the hospital:    Aleve  --  by mouth , As Needed

## 2017-04-27 NOTE — DISCHARGE NOTE ADULT - CARE PROVIDERS DIRECT ADDRESSES
[email or web address]

## 2017-04-27 NOTE — DISCHARGE NOTE ADULT - HOSPITAL COURSE
26F with history of ectopic pregnancy s/p R salpingectomy in 2009, alcohol abuse, necrotizing pancreatitis in 2013, presented on 4/25 with back and abdominal pain for four days. The pain started after an episode of heavy drinking associated with nausea and emesis.   On imaging, the patient was found to have splenic vein thrombosis and started on therapeutic lovenox.  The patient was admitted to the surgical service and made NPO with IVF. She was on CIWA protocol for which she required no medication.   Her pain improved slowly and her diet was advanced. She remained hemodynamically stable throughout the hospital stay. She was advised to decrease alcohol consumption.  She was discharged on eliquis with instructions to follow up with her primary care doctor and Dr. Oscar. 26F with history of ectopic pregnancy s/p R salpingectomy in 2009, alcohol abuse, necrotizing pancreatitis in 2013, presented on 4/25 with back and abdominal pain for four days. The pain started after an episode of heavy drinking associated with nausea and emesis.   On imaging, the patient was found to have splenic vein thrombosis and started on therapeutic lovenox.  The patient was admitted to the surgical service and made NPO with IVF. She was on CIWA protocol for which she required no medication.   Her pain improved slowly and her diet was advanced. She remained hemodynamically stable throughout the hospital stay. She was advised to decrease alcohol consumption.  She was discharged on coumadin with instructions to follow up with her primary care doctor and Dr. Oscar.   Dr. Augustin Sanabria patient's primary medical doctor was contacted and agrees to follow up patient's INR while on coumadin. 26F with history of ectopic pregnancy s/p R salpingectomy in 2009, alcohol abuse, necrotizing pancreatitis in 2013, presented on 4/25 with back and abdominal pain for four days. The pain started after an episode of heavy drinking associated with nausea and emesis.   On imaging, the patient was found to have splenic vein thrombosis and started on therapeutic lovenox.  The patient was admitted to the surgical service and made NPO with IVF. She was on CIWA protocol for which she required no medication.   Her pain improved slowly and her diet was advanced. She remained hemodynamically stable throughout the hospital stay. She was advised not to drink alcohol.  She was discharged on coumadin and therapeutic lovenox with instructions to follow up with her primary care doctor tomorrow for further lovenox and coumadin dosing and instructions.  Patient demonstrated proficiency in lovenox injections with her nurse while she was in the hospital.  She was given a 3 day prescription for her lovenox and coumadin to get her to her appointment on Saturday and Monday with her primary care doctor.  She will also follow up with Dr. Ward in 1-2 weeks.    Dr. Augustin Sanabria patient's primary medical doctor was contacted and agrees to follow up patient's INR while on coumadin.   Patient is pain free, tolerating regular diet, voiding and ambulating without difficulty and agrees to follow up with Dr. Augustin Sanabria tomorrow in the office. 26F with history of ectopic pregnancy s/p R salpingectomy in 2009, alcohol abuse, necrotizing pancreatitis in 2013, presented on 4/25 with back and abdominal pain for four days. The pain started after an episode of heavy drinking associated with nausea and emesis.   On imaging, the patient was found to have splenic vein thrombosis and started on therapeutic lovenox.  The patient was admitted to the surgical service and made NPO with IVF. She was on CIWA protocol for which she required no medication.   Her pain improved slowly and her diet was advanced. She remained hemodynamically stable throughout the hospital stay. She was advised not to drink alcohol.  She was discharged on coumadin and therapeutic lovenox with instructions to follow up with her primary care doctor tomorrow for further lovenox and coumadin dosing and instructions.  Patient demonstrated proficiency in lovenox injections with her nurse while she was in the hospital.  She was given a 3 day prescription for her lovenox and coumadin to get her to her appointment on Saturday and Monday with her primary care doctor.  She will also follow up with Dr. Ward in 1-2 weeks.    Dr. Augustin Sanabria patient's primary medical doctor was contacted and agrees to follow up patient's INR while on coumadin.  Patient was seen by ENT for a few drops of blood in her right ear.  They recommended ofloxicin drops to right ear for 5 days as prescribed and no q tip use.    Patient is pain free, tolerating regular diet, voiding and ambulating without difficulty and agrees to follow up with Dr. Augustin Sanabria tomorrow in the office.

## 2017-04-27 NOTE — DISCHARGE NOTE ADULT - PLAN OF CARE
Obtain proper follow up Please follow up with Dr. Ward in two weeks. Refrain from drinking alcohol.   Return to the hospital if you notice high fevers, abdominal pain, or food intolerance. You were started on anticoagulation in the hospital Please follow up with Primary medical doctor Dr. Augustin Sanabria for INR checks. You are at high risk for bleeding/brusing.  If your notice easy bleeding or bruising, please follow up with your primary medical doctor. Please follow up with Primary medical doctor Dr. Augustin Sanabria for INR checks.  Please follow up with Dr. Sanabria tomorrow 8/29/17 for INR check and for further instructions for your lovenox dosing.  You have been given a prescription for 3 days for Lovenox and Coumadin after which point you will need a new prescription on Monday for further dosing.  You are at high risk for bleeding/brusing.  If your notice easy bleeding or bruising, please follow up with your primary medical doctor.  If you have any major bleeding please go to your nearest emergengy room.  You had a few drops of blood from your right ear while in the hospital.  You were seen by the Ear Nose And Throat team who recommended Ofloxacin drops.  You did not want a prescription for the drops at this time and said that you will follow up with your Primary Care Doctor tomorrow in the office about this. Please follow up with Primary medical doctor Dr. Augustin Sanabria for INR checks.  Please follow up with Dr. Sanabria tomorrow 8/29/17 for INR check and for further instructions for your lovenox and coumadin dosing.  You have been given a prescription for 3 days for Lovenox and Coumadin after which point you will need a new prescription on Monday from Dr. Sanabria for further dosing.  You are at high risk for bleeding/brusing.  If your notice easy bleeding or bruising, please follow up with your primary medical doctor.  If you have any major bleeding please go to your nearest emergengy room.  You had a few drops of blood from your right ear while in the hospital.  You were seen by the Ear Nose And Throat team who did an exam and recommended Ofloxacin drops.  Please put 4 drops in your right ear twice daily for 5 days and then follow up with your Dr. Sanabria about that as well.  Do not use a q tip.

## 2017-04-27 NOTE — DISCHARGE NOTE ADULT - PATIENT PORTAL LINK FT
“You can access the [facility] Patient Portal, offered by [facility], by registering with the following website: [email or web address]”

## 2017-04-28 VITALS
SYSTOLIC BLOOD PRESSURE: 129 MMHG | RESPIRATION RATE: 18 BRPM | OXYGEN SATURATION: 100 % | TEMPERATURE: 99 F | HEART RATE: 86 BPM | DIASTOLIC BLOOD PRESSURE: 86 MMHG

## 2017-04-28 LAB
INR BLD: 1 — SIGNIFICANT CHANGE UP (ref 0.88–1.17)
PROTHROM AB SERPL-ACNC: 11.2 SEC — SIGNIFICANT CHANGE UP (ref 9.8–13.1)

## 2017-04-28 RX ORDER — WARFARIN SODIUM 2.5 MG/1
5 TABLET ORAL ONCE
Qty: 0 | Refills: 0 | Status: COMPLETED | OUTPATIENT
Start: 2017-04-28 | End: 2017-04-28

## 2017-04-28 RX ORDER — OFLOXACIN 0.3 %
4 DROPS OPHTHALMIC (EYE) EVERY 12 HOURS
Qty: 0 | Refills: 0 | Status: DISCONTINUED | OUTPATIENT
Start: 2017-04-28 | End: 2017-04-28

## 2017-04-28 RX ORDER — OFLOXACIN 0.3 %
4 DROPS OPHTHALMIC (EYE)
Qty: 5 | Refills: 0 | OUTPATIENT
Start: 2017-04-28 | End: 2017-05-03

## 2017-04-28 RX ORDER — ENOXAPARIN SODIUM 100 MG/ML
75 INJECTION SUBCUTANEOUS
Qty: 225 | Refills: 0 | OUTPATIENT
Start: 2017-04-28 | End: 2017-05-01

## 2017-04-28 RX ORDER — WARFARIN SODIUM 2.5 MG/1
5 TABLET ORAL
Qty: 15 | Refills: 0 | OUTPATIENT
Start: 2017-04-28 | End: 2017-05-01

## 2017-04-28 RX ADMIN — OXYCODONE HYDROCHLORIDE 10 MILLIGRAM(S): 5 TABLET ORAL at 18:26

## 2017-04-28 RX ADMIN — WARFARIN SODIUM 5 MILLIGRAM(S): 2.5 TABLET ORAL at 18:26

## 2017-04-28 RX ADMIN — ENOXAPARIN SODIUM 75 MILLIGRAM(S): 100 INJECTION SUBCUTANEOUS at 13:42

## 2017-04-28 RX ADMIN — Medication 4 DROP(S): at 18:26

## 2019-03-22 ENCOUNTER — INPATIENT (INPATIENT)
Facility: HOSPITAL | Age: 29
LOS: 3 days | Discharge: HOME | End: 2019-03-26
Attending: SURGERY | Admitting: SURGERY
Payer: MEDICAID

## 2019-03-22 VITALS
HEART RATE: 113 BPM | DIASTOLIC BLOOD PRESSURE: 99 MMHG | TEMPERATURE: 98 F | SYSTOLIC BLOOD PRESSURE: 137 MMHG | OXYGEN SATURATION: 99 % | RESPIRATION RATE: 16 BRPM

## 2019-03-22 DIAGNOSIS — Z87.59 PERSONAL HISTORY OF OTHER COMPLICATIONS OF PREGNANCY, CHILDBIRTH AND THE PUERPERIUM: Chronic | ICD-10-CM

## 2019-03-22 DIAGNOSIS — K85.90 ACUTE PANCREATITIS WITHOUT NECROSIS OR INFECTION, UNSPECIFIED: ICD-10-CM

## 2019-03-22 LAB
ALBUMIN SERPL ELPH-MCNC: 3.4 G/DL — SIGNIFICANT CHANGE UP (ref 3.3–5)
ALBUMIN SERPL ELPH-MCNC: 3.6 G/DL — SIGNIFICANT CHANGE UP (ref 3.3–5)
ALP SERPL-CCNC: 164 U/L — HIGH (ref 40–120)
ALP SERPL-CCNC: 168 U/L — HIGH (ref 40–120)
ALT FLD-CCNC: 117 U/L — HIGH (ref 4–33)
ALT FLD-CCNC: 121 U/L — HIGH (ref 4–33)
ANION GAP SERPL CALC-SCNC: 16 MMO/L — HIGH (ref 7–14)
ANION GAP SERPL CALC-SCNC: 16 MMO/L — HIGH (ref 7–14)
AST SERPL-CCNC: 894 U/L — HIGH (ref 4–32)
AST SERPL-CCNC: SIGNIFICANT CHANGE UP U/L (ref 4–32)
BASE EXCESS BLDV CALC-SCNC: 1.1 MMOL/L — SIGNIFICANT CHANGE UP
BASOPHILS # BLD AUTO: 0.07 K/UL — SIGNIFICANT CHANGE UP (ref 0–0.2)
BASOPHILS NFR BLD AUTO: 1 % — SIGNIFICANT CHANGE UP (ref 0–2)
BILIRUB SERPL-MCNC: 0.4 MG/DL — SIGNIFICANT CHANGE UP (ref 0.2–1.2)
BILIRUB SERPL-MCNC: 0.6 MG/DL — SIGNIFICANT CHANGE UP (ref 0.2–1.2)
BLOOD GAS VENOUS - CREATININE: 0.57 MG/DL — SIGNIFICANT CHANGE UP (ref 0.5–1.3)
BUN SERPL-MCNC: 4 MG/DL — LOW (ref 7–23)
BUN SERPL-MCNC: 4 MG/DL — LOW (ref 7–23)
CALCIUM SERPL-MCNC: 8 MG/DL — LOW (ref 8.4–10.5)
CALCIUM SERPL-MCNC: 8.4 MG/DL — SIGNIFICANT CHANGE UP (ref 8.4–10.5)
CHLORIDE BLDV-SCNC: 103 MMOL/L — SIGNIFICANT CHANGE UP (ref 96–108)
CHLORIDE SERPL-SCNC: 104 MMOL/L — SIGNIFICANT CHANGE UP (ref 98–107)
CHLORIDE SERPL-SCNC: 99 MMOL/L — SIGNIFICANT CHANGE UP (ref 98–107)
CO2 SERPL-SCNC: 21 MMOL/L — LOW (ref 22–31)
CO2 SERPL-SCNC: 22 MMOL/L — SIGNIFICANT CHANGE UP (ref 22–31)
CREAT SERPL-MCNC: 0.48 MG/DL — LOW (ref 0.5–1.3)
CREAT SERPL-MCNC: 0.51 MG/DL — SIGNIFICANT CHANGE UP (ref 0.5–1.3)
EOSINOPHIL # BLD AUTO: 0.04 K/UL — SIGNIFICANT CHANGE UP (ref 0–0.5)
EOSINOPHIL NFR BLD AUTO: 0.6 % — SIGNIFICANT CHANGE UP (ref 0–6)
GAS PNL BLDV: 136 MMOL/L — SIGNIFICANT CHANGE UP (ref 136–146)
GLUCOSE BLDV-MCNC: 104 — HIGH (ref 70–99)
GLUCOSE SERPL-MCNC: 96 MG/DL — SIGNIFICANT CHANGE UP (ref 70–99)
GLUCOSE SERPL-MCNC: 99 MG/DL — SIGNIFICANT CHANGE UP (ref 70–99)
HCO3 BLDV-SCNC: 25 MMOL/L — SIGNIFICANT CHANGE UP (ref 20–27)
HCT VFR BLD CALC: 30.2 % — LOW (ref 34.5–45)
HCT VFR BLDV CALC: 28.6 % — LOW (ref 34.5–45)
HGB BLD-MCNC: 8.6 G/DL — LOW (ref 11.5–15.5)
HGB BLDV-MCNC: 9.2 G/DL — LOW (ref 11.5–15.5)
IMM GRANULOCYTES NFR BLD AUTO: 0.4 % — SIGNIFICANT CHANGE UP (ref 0–1.5)
LACTATE BLDV-MCNC: 3.9 MMOL/L — HIGH (ref 0.5–2)
LIDOCAIN IGE QN: 119.9 U/L — HIGH (ref 7–60)
LYMPHOCYTES # BLD AUTO: 0.69 K/UL — LOW (ref 1–3.3)
LYMPHOCYTES # BLD AUTO: 9.7 % — LOW (ref 13–44)
MCHC RBC-ENTMCNC: 25.7 PG — LOW (ref 27–34)
MCHC RBC-ENTMCNC: 28.5 % — LOW (ref 32–36)
MCV RBC AUTO: 90.1 FL — SIGNIFICANT CHANGE UP (ref 80–100)
MONOCYTES # BLD AUTO: 0.48 K/UL — SIGNIFICANT CHANGE UP (ref 0–0.9)
MONOCYTES NFR BLD AUTO: 6.7 % — SIGNIFICANT CHANGE UP (ref 2–14)
NEUTROPHILS # BLD AUTO: 5.84 K/UL — SIGNIFICANT CHANGE UP (ref 1.8–7.4)
NEUTROPHILS NFR BLD AUTO: 81.6 % — HIGH (ref 43–77)
NRBC # FLD: 0 K/UL — LOW (ref 25–125)
PCO2 BLDV: 44 MMHG — SIGNIFICANT CHANGE UP (ref 41–51)
PH BLDV: 7.39 PH — SIGNIFICANT CHANGE UP (ref 7.32–7.43)
PLATELET # BLD AUTO: 350 K/UL — SIGNIFICANT CHANGE UP (ref 150–400)
PMV BLD: 10.8 FL — SIGNIFICANT CHANGE UP (ref 7–13)
PO2 BLDV: 59 MMHG — HIGH (ref 35–40)
POTASSIUM BLDV-SCNC: 6.9 MMOL/L — CRITICAL HIGH (ref 3.4–4.5)
POTASSIUM SERPL-MCNC: 3.3 MMOL/L — LOW (ref 3.5–5.3)
POTASSIUM SERPL-MCNC: 5.5 MMOL/L — HIGH (ref 3.5–5.3)
POTASSIUM SERPL-SCNC: 3.3 MMOL/L — LOW (ref 3.5–5.3)
POTASSIUM SERPL-SCNC: 5.5 MMOL/L — HIGH (ref 3.5–5.3)
PROT SERPL-MCNC: 7.1 G/DL — SIGNIFICANT CHANGE UP (ref 6–8.3)
PROT SERPL-MCNC: 7.7 G/DL — SIGNIFICANT CHANGE UP (ref 6–8.3)
RBC # BLD: 3.35 M/UL — LOW (ref 3.8–5.2)
RBC # FLD: 19.9 % — HIGH (ref 10.3–14.5)
SAO2 % BLDV: 86 % — HIGH (ref 60–85)
SODIUM SERPL-SCNC: 137 MMOL/L — SIGNIFICANT CHANGE UP (ref 135–145)
SODIUM SERPL-SCNC: 141 MMOL/L — SIGNIFICANT CHANGE UP (ref 135–145)
WBC # BLD: 7.15 K/UL — SIGNIFICANT CHANGE UP (ref 3.8–10.5)
WBC # FLD AUTO: 7.15 K/UL — SIGNIFICANT CHANGE UP (ref 3.8–10.5)

## 2019-03-22 PROCEDURE — 74177 CT ABD & PELVIS W/CONTRAST: CPT | Mod: 26

## 2019-03-22 PROCEDURE — 99221 1ST HOSP IP/OBS SF/LOW 40: CPT

## 2019-03-22 RX ORDER — SODIUM CHLORIDE 9 MG/ML
1000 INJECTION, SOLUTION INTRAVENOUS
Qty: 0 | Refills: 0 | Status: DISCONTINUED | OUTPATIENT
Start: 2019-03-22 | End: 2019-03-23

## 2019-03-22 RX ORDER — MORPHINE SULFATE 50 MG/1
4 CAPSULE, EXTENDED RELEASE ORAL ONCE
Qty: 0 | Refills: 0 | Status: DISCONTINUED | OUTPATIENT
Start: 2019-03-22 | End: 2019-03-22

## 2019-03-22 RX ORDER — ONDANSETRON 8 MG/1
4 TABLET, FILM COATED ORAL ONCE
Qty: 0 | Refills: 0 | Status: COMPLETED | OUTPATIENT
Start: 2019-03-22 | End: 2019-03-22

## 2019-03-22 RX ORDER — KETOROLAC TROMETHAMINE 30 MG/ML
15 SYRINGE (ML) INJECTION ONCE
Qty: 0 | Refills: 0 | Status: DISCONTINUED | OUTPATIENT
Start: 2019-03-22 | End: 2019-03-22

## 2019-03-22 RX ORDER — FAMOTIDINE 10 MG/ML
20 INJECTION INTRAVENOUS ONCE
Qty: 0 | Refills: 0 | Status: COMPLETED | OUTPATIENT
Start: 2019-03-22 | End: 2019-03-22

## 2019-03-22 RX ORDER — ENOXAPARIN SODIUM 100 MG/ML
40 INJECTION SUBCUTANEOUS DAILY
Qty: 0 | Refills: 0 | Status: DISCONTINUED | OUTPATIENT
Start: 2019-03-22 | End: 2019-03-26

## 2019-03-22 RX ORDER — SODIUM CHLORIDE 9 MG/ML
2000 INJECTION, SOLUTION INTRAVENOUS ONCE
Qty: 0 | Refills: 0 | Status: COMPLETED | OUTPATIENT
Start: 2019-03-22 | End: 2019-03-22

## 2019-03-22 RX ADMIN — SODIUM CHLORIDE 2000 MILLILITER(S): 9 INJECTION, SOLUTION INTRAVENOUS at 12:38

## 2019-03-22 RX ADMIN — MORPHINE SULFATE 4 MILLIGRAM(S): 50 CAPSULE, EXTENDED RELEASE ORAL at 17:37

## 2019-03-22 RX ADMIN — MORPHINE SULFATE 4 MILLIGRAM(S): 50 CAPSULE, EXTENDED RELEASE ORAL at 17:55

## 2019-03-22 RX ADMIN — MORPHINE SULFATE 4 MILLIGRAM(S): 50 CAPSULE, EXTENDED RELEASE ORAL at 12:49

## 2019-03-22 RX ADMIN — SODIUM CHLORIDE 100 MILLILITER(S): 9 INJECTION, SOLUTION INTRAVENOUS at 22:47

## 2019-03-22 RX ADMIN — ONDANSETRON 4 MILLIGRAM(S): 8 TABLET, FILM COATED ORAL at 22:47

## 2019-03-22 RX ADMIN — ONDANSETRON 4 MILLIGRAM(S): 8 TABLET, FILM COATED ORAL at 12:34

## 2019-03-22 RX ADMIN — FAMOTIDINE 20 MILLIGRAM(S): 10 INJECTION INTRAVENOUS at 12:34

## 2019-03-22 RX ADMIN — Medication 15 MILLIGRAM(S): at 22:47

## 2019-03-22 RX ADMIN — SODIUM CHLORIDE 100 MILLILITER(S): 9 INJECTION, SOLUTION INTRAVENOUS at 21:11

## 2019-03-22 RX ADMIN — MORPHINE SULFATE 4 MILLIGRAM(S): 50 CAPSULE, EXTENDED RELEASE ORAL at 12:34

## 2019-03-22 RX ADMIN — Medication 15 MILLIGRAM(S): at 23:17

## 2019-03-22 NOTE — H&P ADULT - ASSESSMENT
Patient is a 28F with recurrent ETOH pancreatitis with 9cm pseudocyst  - admit to surgery under Dr. Marx  - NPO/IVF  - pain control  - CIWA for potential etoh withdrawal  - MRCP to evaluate pancreatic ducts  - discussed with Dr. Francisco J Rmaos PGY2  [id number]

## 2019-03-22 NOTE — H&P ADULT - ATTENDING COMMENTS
I have personally interviewed and examined this patient, reviewed pertinent labs and imaging, and discussed the case with colleagues, residents, and physician assistants on B Team rounds.    Plan  CIWA  mrcp to evaluate for ductal disruption  trend lfts  clears as tolerated  serial exams      The Acute Care Surgery (B Team) Attending Group Practice:  Dr. Gaetano Dia, Dr. Mel Ascencio, Dr. Cem Ward, Dr. Patricia Marx, Dr. Jacinto Ge    urgent issues - spectra [id number] or [id number]  nonurgent issues - [phone]  patient appointments or afterhours - [phone]

## 2019-03-22 NOTE — H&P ADULT - HISTORY OF PRESENT ILLNESS
Patient is a 27 yo F with PMHX of necrotising pancreatitis in 2017 2/2 ETOH presenting to the ED with abdominal pain, N/V for 3 days.  Since her last episode of pancreatitis she was told not to drink, however she drinks usually 1 glass of wine every night without pain.  3 days ago however she drank a bottle of wine and multiple shots of vodka.  She has had severe abdominal pain, N/V since.  The pain is worse with eating so she has had minimal PO intake.  The pain is epigastric but radiates down and to her back.  She has not had any episodes of pancreatitis since she was last admitted in 2017.

## 2019-03-22 NOTE — H&P ADULT - NSICDXPASTMEDICALHX_GEN_ALL_CORE_FT
PAST MEDICAL HISTORY:  Alcohol abuse, daily use     Ectopic pregnancy 2009    Pancreatitis 2017 necrotizing pancreatitis

## 2019-03-22 NOTE — ED PROVIDER NOTE - CLINICAL SUMMARY MEDICAL DECISION MAKING FREE TEXT BOX
27 yo F with PMHX of necrotising pancreatitis, ETOH abuse last intake was this past 2 weeks involving 1 bottle of wine and 3 gin and tonics, last drink was on Monday x 5 days ago, h/o ectopic pregnancy presents to the ED today c/o epigastric pain radiating to the back and b/l flanks which feels like her pancreatitis, + nausea and multiple episodes of vomiting, pain worse with eating, + soft stools the other day x 3 days.  Plan labs, Ct a/p, IVF, pain meds, likely admit. [place] att: 29 yo F with PMHX of necrotising pancreatitis, ETOH abuse last intake was this past 2 weeks involving 1 bottle of wine and 3 gin and tonics, last drink was on Monday x 5 days ago, h/o ectopic pregnancy presents to the ED today c/o epigastric pain radiating to the back and b/l flanks which feels like her pancreatitis, + nausea and multiple episodes of vomiting, pain worse with eating, + soft stools the other day x 3 days.  Plan labs, Ct a/p, IVF, pain meds, likely admit.

## 2019-03-22 NOTE — ED PROVIDER NOTE - OBJECTIVE STATEMENT
27 yo F with PMHX of necrotising pancreatitis, ETOH abuse last intake was this past 2 weeks involving 1 bottle of wine and 3 gin and tonics, last drink was on Monday x 5 days ago, h/o ectopic pregnancy presents to the ED today c/o epigastric pain radiating to the back and b/l flanks which feels like her pancreatitis, + nausea and multiple episodes of vomiting, pain worse with eating, + soft stools the other day x 3 days. Endorsing subjective fevers and chills. Denies urinary complaints, vaginal complaints, cp ,sob.

## 2019-03-22 NOTE — ED PROVIDER NOTE - PROGRESS NOTE DETAILS
Surgery seen and examined patient at bedside, as per surg rec. 100cc per hour maintence of LR, NPO and admission to Dr. Marx.

## 2019-03-22 NOTE — H&P ADULT - NSHPPHYSICALEXAM_GEN_ALL_CORE
T(C): 36.9 (03-22-19 @ 18:39), Max: 37.1 (03-22-19 @ 12:43)  HR: 99 (03-22-19 @ 18:39) (88 - 113)  BP: 125/83 (03-22-19 @ 18:39) (125/83 - 137/99)  RR: 16 (03-22-19 @ 18:39) (16 - 16)  SpO2: 99% (03-22-19 @ 18:39) (97% - 99%)  Wt(kg): --      Physical Exam:  General: WN/WD NAD  Neurology: A&Ox3, nonfocal, follows commands  Eyes: PERRLA/ EOMI  Respiratory: CTA B/L  CV: Normal rate regular rhythm  Abdominal: Soft, ND, tender in epigastrium  Extremities: No edema, + peripheral pulses  Skin: No Rashes, Hematoma, Ecchymosis

## 2019-03-22 NOTE — ED ADULT NURSE REASSESSMENT NOTE - GENERAL PATIENT STATE
comfortable appearance/cooperative
resting/sleeping/comfortable appearance/cooperative/smiling/interactive

## 2019-03-22 NOTE — ED ADULT NURSE REASSESSMENT NOTE - NS ED NURSE REASSESS COMMENT FT1
Report received from primary RN, Ofelia. Pt resting comfortably in bed, states pain is tolerable at this time, VSS, pt stable, in NAD, will continue to monitor.

## 2019-03-22 NOTE — ED ADULT NURSE NOTE - OBJECTIVE STATEMENT
patient received in bed . Alert & ox3. e.pt . evaluated by MD.pt complains of abdominal pain radiating to the right upper quadrant and lower back, nausea&vomitting  . Placed 20g angiocath right  AC., labs drawn and sent. Rectal temp. done. IVF LR started as per order.patient will be waiting for results, further evaluation  made comfortable.will continue to monitor.

## 2019-03-22 NOTE — H&P ADULT - NSHPLABSRESULTS_GEN_ALL_CORE
8.6    7.15  )-----------( 350      ( 22 Mar 2019 12:59 )             30.2     03-22    141  |  104  |  4<L>  ----------------------------<  96  3.3<L>   |  21<L>  |  0.48<L>    Ca    8.0<L>      22 Mar 2019 14:30    TPro  7.1  /  Alb  3.4  /  TBili  0.4  /  DBili  x   /  AST  894<H>  /  ALT  121<H>  /  AlkPhos  164<H>  03-22    LIVER FUNCTIONS - ( 22 Mar 2019 14:30 )  Alb: 3.4 g/dL / Pro: 7.1 g/dL / ALK PHOS: 164 u/L / ALT: 121 u/L / AST: 894 u/L / GGT: x             < from: CT Abdomen and Pelvis w/ IV Cont (03.22.19 @ 15:49) >    FINDINGS:    LOWER CHEST: Within normal limits.    LIVER: Within normal limits.  BILE DUCTS: Normal caliber.  GALLBLADDER: Within normal limits.  SPLEEN: Within normal limits.  PANCREAS: Overall heterogeneous enhancement of the pancreas with   peripancreatic stranding concerning for acute pancreatitis. Addition,   there appears to be dilatation of the pancreatic duct within the tail   measuring up to 5 mm (series 2 image 34) likely related to chronic   pancreatitis. Additional cystic foci may represent dilated side branches.   There is a fluid collection adjacent to the pancreatic tail measuring 9.1   x 7.6 x 8.9 cm (series 2 image 22 and series 602 image 42).  ADRENALS: Within normal limits.  KIDNEYS/URETERS: No hydronephrosis.    BLADDER: Within normal limits.  REPRODUCTIVE ORGANS: Unremarkable CT appearance of uterus and adnexa.    BOWEL: No bowel obstruction. Appendix is normal.  PERITONEUM: No ascites.  VESSELS:  Normal caliber abdominal aorta. Patent portal vein, SMV and   splenic vein which is attenuated distally.  RETROPERITONEUM: No lymphadenopathy.    ABDOMINAL WALL: Within normal limits.  BONES: Within normal limits.    IMPRESSION:     Findings representing acute on chronic pancreatitis with peripancreatic   fluid collection adjacent to the tail measuring 9.1 cm.    < end of copied text >

## 2019-03-22 NOTE — ED ADULT TRIAGE NOTE - CHIEF COMPLAINT QUOTE
c/o mid abdominal pain radiating into RUQ and into lower back with N/V/D x 3 days. Denies fever/chills. Hx pancreatitis

## 2019-03-23 PROBLEM — K85.90 ACUTE PANCREATITIS WITHOUT NECROSIS OR INFECTION, UNSPECIFIED: Chronic | Status: ACTIVE | Noted: 2017-04-25

## 2019-03-23 LAB
ALBUMIN SERPL ELPH-MCNC: 3 G/DL — LOW (ref 3.3–5)
ALP SERPL-CCNC: 144 U/L — HIGH (ref 40–120)
ALT FLD-CCNC: 199 U/L — HIGH (ref 4–33)
ANION GAP SERPL CALC-SCNC: 15 MMO/L — HIGH (ref 7–14)
ANION GAP SERPL CALC-SCNC: 17 MMO/L — HIGH (ref 7–14)
AST SERPL-CCNC: 1553 U/L — HIGH (ref 4–32)
BILIRUB DIRECT SERPL-MCNC: 0.2 MG/DL — SIGNIFICANT CHANGE UP (ref 0.1–0.2)
BILIRUB SERPL-MCNC: 0.5 MG/DL — SIGNIFICANT CHANGE UP (ref 0.2–1.2)
BUN SERPL-MCNC: 4 MG/DL — LOW (ref 7–23)
BUN SERPL-MCNC: 5 MG/DL — LOW (ref 7–23)
CALCIUM SERPL-MCNC: 7.8 MG/DL — LOW (ref 8.4–10.5)
CALCIUM SERPL-MCNC: 8 MG/DL — LOW (ref 8.4–10.5)
CHLORIDE SERPL-SCNC: 98 MMOL/L — SIGNIFICANT CHANGE UP (ref 98–107)
CHLORIDE SERPL-SCNC: 99 MMOL/L — SIGNIFICANT CHANGE UP (ref 98–107)
CO2 SERPL-SCNC: 20 MMOL/L — LOW (ref 22–31)
CO2 SERPL-SCNC: 25 MMOL/L — SIGNIFICANT CHANGE UP (ref 22–31)
CREAT SERPL-MCNC: 0.49 MG/DL — LOW (ref 0.5–1.3)
CREAT SERPL-MCNC: 0.52 MG/DL — SIGNIFICANT CHANGE UP (ref 0.5–1.3)
GLUCOSE SERPL-MCNC: 65 MG/DL — LOW (ref 70–99)
GLUCOSE SERPL-MCNC: 67 MG/DL — LOW (ref 70–99)
HCG UR-SCNC: NEGATIVE — SIGNIFICANT CHANGE UP
HCT VFR BLD CALC: 27.3 % — LOW (ref 34.5–45)
HGB BLD-MCNC: 7.8 G/DL — LOW (ref 11.5–15.5)
MAGNESIUM SERPL-MCNC: 1.5 MG/DL — LOW (ref 1.6–2.6)
MCHC RBC-ENTMCNC: 25.4 PG — LOW (ref 27–34)
MCHC RBC-ENTMCNC: 28.6 % — LOW (ref 32–36)
MCV RBC AUTO: 88.9 FL — SIGNIFICANT CHANGE UP (ref 80–100)
NRBC # FLD: 0.02 K/UL — LOW (ref 25–125)
PHOSPHATE SERPL-MCNC: 2.7 MG/DL — SIGNIFICANT CHANGE UP (ref 2.5–4.5)
PLATELET # BLD AUTO: 308 K/UL — SIGNIFICANT CHANGE UP (ref 150–400)
PMV BLD: 11.2 FL — SIGNIFICANT CHANGE UP (ref 7–13)
POTASSIUM SERPL-MCNC: 2.8 MMOL/L — CRITICAL LOW (ref 3.5–5.3)
POTASSIUM SERPL-MCNC: 3.8 MMOL/L — SIGNIFICANT CHANGE UP (ref 3.5–5.3)
POTASSIUM SERPL-SCNC: 2.8 MMOL/L — CRITICAL LOW (ref 3.5–5.3)
POTASSIUM SERPL-SCNC: 3.8 MMOL/L — SIGNIFICANT CHANGE UP (ref 3.5–5.3)
PROT SERPL-MCNC: 6.5 G/DL — SIGNIFICANT CHANGE UP (ref 6–8.3)
RBC # BLD: 3.07 M/UL — LOW (ref 3.8–5.2)
RBC # FLD: 20.2 % — HIGH (ref 10.3–14.5)
SODIUM SERPL-SCNC: 135 MMOL/L — SIGNIFICANT CHANGE UP (ref 135–145)
SODIUM SERPL-SCNC: 139 MMOL/L — SIGNIFICANT CHANGE UP (ref 135–145)
SP GR UR: 1.04 — HIGH (ref 1–1.03)
WBC # BLD: 6.36 K/UL — SIGNIFICANT CHANGE UP (ref 3.8–10.5)
WBC # FLD AUTO: 6.36 K/UL — SIGNIFICANT CHANGE UP (ref 3.8–10.5)

## 2019-03-23 PROCEDURE — 93010 ELECTROCARDIOGRAM REPORT: CPT

## 2019-03-23 RX ORDER — POTASSIUM PHOSPHATE, MONOBASIC POTASSIUM PHOSPHATE, DIBASIC 236; 224 MG/ML; MG/ML
15 INJECTION, SOLUTION INTRAVENOUS ONCE
Qty: 0 | Refills: 0 | Status: COMPLETED | OUTPATIENT
Start: 2019-03-23 | End: 2019-03-23

## 2019-03-23 RX ORDER — HYDROMORPHONE HYDROCHLORIDE 2 MG/ML
1 INJECTION INTRAMUSCULAR; INTRAVENOUS; SUBCUTANEOUS EVERY 6 HOURS
Qty: 0 | Refills: 0 | Status: DISCONTINUED | OUTPATIENT
Start: 2019-03-23 | End: 2019-03-24

## 2019-03-23 RX ORDER — KETOROLAC TROMETHAMINE 30 MG/ML
15 SYRINGE (ML) INJECTION EVERY 6 HOURS
Qty: 0 | Refills: 0 | Status: DISCONTINUED | OUTPATIENT
Start: 2019-03-23 | End: 2019-03-24

## 2019-03-23 RX ORDER — POTASSIUM CHLORIDE 20 MEQ
20 PACKET (EA) ORAL ONCE
Qty: 0 | Refills: 0 | Status: COMPLETED | OUTPATIENT
Start: 2019-03-23 | End: 2019-03-23

## 2019-03-23 RX ORDER — MAGNESIUM SULFATE 500 MG/ML
2 VIAL (ML) INJECTION ONCE
Qty: 0 | Refills: 0 | Status: COMPLETED | OUTPATIENT
Start: 2019-03-23 | End: 2019-03-23

## 2019-03-23 RX ORDER — DEXTROSE MONOHYDRATE, SODIUM CHLORIDE, AND POTASSIUM CHLORIDE 50; .745; 4.5 G/1000ML; G/1000ML; G/1000ML
1000 INJECTION, SOLUTION INTRAVENOUS
Qty: 0 | Refills: 0 | Status: DISCONTINUED | OUTPATIENT
Start: 2019-03-23 | End: 2019-03-25

## 2019-03-23 RX ORDER — POTASSIUM CHLORIDE 20 MEQ
10 PACKET (EA) ORAL
Qty: 0 | Refills: 0 | Status: DISCONTINUED | OUTPATIENT
Start: 2019-03-23 | End: 2019-03-23

## 2019-03-23 RX ORDER — POTASSIUM CHLORIDE 20 MEQ
20 PACKET (EA) ORAL
Qty: 0 | Refills: 0 | Status: COMPLETED | OUTPATIENT
Start: 2019-03-23 | End: 2019-03-23

## 2019-03-23 RX ORDER — KETOROLAC TROMETHAMINE 30 MG/ML
15 SYRINGE (ML) INJECTION ONCE
Qty: 0 | Refills: 0 | Status: DISCONTINUED | OUTPATIENT
Start: 2019-03-23 | End: 2019-03-23

## 2019-03-23 RX ORDER — HYDROMORPHONE HYDROCHLORIDE 2 MG/ML
0.5 INJECTION INTRAMUSCULAR; INTRAVENOUS; SUBCUTANEOUS EVERY 4 HOURS
Qty: 0 | Refills: 0 | Status: DISCONTINUED | OUTPATIENT
Start: 2019-03-23 | End: 2019-03-24

## 2019-03-23 RX ADMIN — Medication 15 MILLIGRAM(S): at 05:59

## 2019-03-23 RX ADMIN — Medication 20 MILLIEQUIVALENT(S): at 16:43

## 2019-03-23 RX ADMIN — HYDROMORPHONE HYDROCHLORIDE 1 MILLIGRAM(S): 2 INJECTION INTRAMUSCULAR; INTRAVENOUS; SUBCUTANEOUS at 22:58

## 2019-03-23 RX ADMIN — Medication 20 MILLIEQUIVALENT(S): at 11:06

## 2019-03-23 RX ADMIN — Medication 100 MILLIEQUIVALENT(S): at 09:53

## 2019-03-23 RX ADMIN — DEXTROSE MONOHYDRATE, SODIUM CHLORIDE, AND POTASSIUM CHLORIDE 100 MILLILITER(S): 50; .745; 4.5 INJECTION, SOLUTION INTRAVENOUS at 17:21

## 2019-03-23 RX ADMIN — HYDROMORPHONE HYDROCHLORIDE 1 MILLIGRAM(S): 2 INJECTION INTRAMUSCULAR; INTRAVENOUS; SUBCUTANEOUS at 14:51

## 2019-03-23 RX ADMIN — DEXTROSE MONOHYDRATE, SODIUM CHLORIDE, AND POTASSIUM CHLORIDE 100 MILLILITER(S): 50; .745; 4.5 INJECTION, SOLUTION INTRAVENOUS at 09:50

## 2019-03-23 RX ADMIN — Medication 20 MILLIEQUIVALENT(S): at 13:21

## 2019-03-23 RX ADMIN — HYDROMORPHONE HYDROCHLORIDE 1 MILLIGRAM(S): 2 INJECTION INTRAMUSCULAR; INTRAVENOUS; SUBCUTANEOUS at 15:15

## 2019-03-23 RX ADMIN — Medication 15 MILLIGRAM(S): at 05:29

## 2019-03-23 RX ADMIN — ENOXAPARIN SODIUM 40 MILLIGRAM(S): 100 INJECTION SUBCUTANEOUS at 11:06

## 2019-03-23 RX ADMIN — Medication 50 GRAM(S): at 11:06

## 2019-03-23 RX ADMIN — DEXTROSE MONOHYDRATE, SODIUM CHLORIDE, AND POTASSIUM CHLORIDE 100 MILLILITER(S): 50; .745; 4.5 INJECTION, SOLUTION INTRAVENOUS at 22:31

## 2019-03-23 RX ADMIN — SODIUM CHLORIDE 100 MILLILITER(S): 9 INJECTION, SOLUTION INTRAVENOUS at 05:29

## 2019-03-23 RX ADMIN — HYDROMORPHONE HYDROCHLORIDE 1 MILLIGRAM(S): 2 INJECTION INTRAMUSCULAR; INTRAVENOUS; SUBCUTANEOUS at 22:28

## 2019-03-23 RX ADMIN — POTASSIUM PHOSPHATE, MONOBASIC POTASSIUM PHOSPHATE, DIBASIC 62.5 MILLIMOLE(S): 236; 224 INJECTION, SOLUTION INTRAVENOUS at 13:21

## 2019-03-23 NOTE — PROGRESS NOTE ADULT - ASSESSMENT
Patient is a 28F with recurrent ETOH pancreatitis with 9cm pseudocyst    - NPO/IVF  - pain control  - CIWA for potential etoh withdrawal  - MRCP to evaluate pancreatic ducts    [id number] Patient is a 28F with recurrent ETOH pancreatitis with 9cm pseudocyst    - Clear Liquid DIet  - pain control  - CIWA for potential etoh withdrawal  - MRCP to evaluate pancreatic ducts  - F/u LFTs    [id number]

## 2019-03-23 NOTE — PROGRESS NOTE ADULT - SUBJECTIVE AND OBJECTIVE BOX
General Surgery Progress Note    S: Patient examined and seen and bedside. Patient complaining of pain overnight, received toradol experience some relied. No other complaints at this time.    O:  Vital Signs Last 24 Hrs  T(C): 36.7 (23 Mar 2019 05:41), Max: 37.1 (22 Mar 2019 12:43)  T(F): 98.1 (23 Mar 2019 05:41), Max: 98.8 (22 Mar 2019 21:03)  HR: 66 (23 Mar 2019 05:41) (66 - 113)  BP: 118/79 (23 Mar 2019 05:41) (118/79 - 137/99)  BP(mean): --  RR: 16 (23 Mar 2019 05:41) (14 - 18)  SpO2: 100% (23 Mar 2019 05:41) (97% - 100%)    I&O's Detail      MEDICATIONS  (STANDING):  enoxaparin Injectable 40 milliGRAM(s) SubCutaneous daily  lactated ringers. 1000 milliLiter(s) (100 mL/Hr) IV Continuous <Continuous>    MEDICATIONS  (PRN):                            8.6    7.15  )-----------( 350      ( 22 Mar 2019 12:59 )             30.2       03-22    141  |  104  |  4<L>  ----------------------------<  96  3.3<L>   |  21<L>  |  0.48<L>    Ca    8.0<L>      22 Mar 2019 14:30    TPro  7.1  /  Alb  3.4  /  TBili  0.4  /  DBili  x   /  AST  894<H>  /  ALT  121<H>  /  AlkPhos  164<H>  03-22        Physical Exam:    Gen: NAD  Resp: Breathing comfortably on room air  CV: Normal sinus rhythm  Abd: Soft, ND, tender in epigastrium

## 2019-03-24 LAB
ALBUMIN SERPL ELPH-MCNC: 3.1 G/DL — LOW (ref 3.3–5)
ALP SERPL-CCNC: 130 U/L — HIGH (ref 40–120)
ALT FLD-CCNC: 144 U/L — HIGH (ref 4–33)
ANION GAP SERPL CALC-SCNC: 11 MMO/L — SIGNIFICANT CHANGE UP (ref 7–14)
AST SERPL-CCNC: 487 U/L — HIGH (ref 4–32)
BILIRUB DIRECT SERPL-MCNC: < 0.2 MG/DL — SIGNIFICANT CHANGE UP (ref 0.1–0.2)
BILIRUB SERPL-MCNC: 0.3 MG/DL — SIGNIFICANT CHANGE UP (ref 0.2–1.2)
BUN SERPL-MCNC: < 2 MG/DL — LOW (ref 7–23)
CALCIUM SERPL-MCNC: 7.7 MG/DL — LOW (ref 8.4–10.5)
CHLORIDE SERPL-SCNC: 103 MMOL/L — SIGNIFICANT CHANGE UP (ref 98–107)
CO2 SERPL-SCNC: 24 MMOL/L — SIGNIFICANT CHANGE UP (ref 22–31)
CREAT SERPL-MCNC: 0.45 MG/DL — LOW (ref 0.5–1.3)
GLUCOSE SERPL-MCNC: 109 MG/DL — HIGH (ref 70–99)
HCT VFR BLD CALC: 29.8 % — LOW (ref 34.5–45)
HGB BLD-MCNC: 8.4 G/DL — LOW (ref 11.5–15.5)
MAGNESIUM SERPL-MCNC: 2.1 MG/DL — SIGNIFICANT CHANGE UP (ref 1.6–2.6)
MCHC RBC-ENTMCNC: 26 PG — LOW (ref 27–34)
MCHC RBC-ENTMCNC: 28.2 % — LOW (ref 32–36)
MCV RBC AUTO: 92.3 FL — SIGNIFICANT CHANGE UP (ref 80–100)
NRBC # FLD: 0 K/UL — LOW (ref 25–125)
PHOSPHATE SERPL-MCNC: 0.9 MG/DL — CRITICAL LOW (ref 2.5–4.5)
PHOSPHATE SERPL-MCNC: 3 MG/DL — SIGNIFICANT CHANGE UP (ref 2.5–4.5)
PLATELET # BLD AUTO: 330 K/UL — SIGNIFICANT CHANGE UP (ref 150–400)
PMV BLD: 11.1 FL — SIGNIFICANT CHANGE UP (ref 7–13)
POTASSIUM SERPL-MCNC: 4.2 MMOL/L — SIGNIFICANT CHANGE UP (ref 3.5–5.3)
POTASSIUM SERPL-SCNC: 4.2 MMOL/L — SIGNIFICANT CHANGE UP (ref 3.5–5.3)
PROT SERPL-MCNC: 6.8 G/DL — SIGNIFICANT CHANGE UP (ref 6–8.3)
RBC # BLD: 3.23 M/UL — LOW (ref 3.8–5.2)
RBC # FLD: 20.1 % — HIGH (ref 10.3–14.5)
SODIUM SERPL-SCNC: 138 MMOL/L — SIGNIFICANT CHANGE UP (ref 135–145)
WBC # BLD: 6.7 K/UL — SIGNIFICANT CHANGE UP (ref 3.8–10.5)
WBC # FLD AUTO: 6.7 K/UL — SIGNIFICANT CHANGE UP (ref 3.8–10.5)

## 2019-03-24 PROCEDURE — 99231 SBSQ HOSP IP/OBS SF/LOW 25: CPT

## 2019-03-24 PROCEDURE — 93010 ELECTROCARDIOGRAM REPORT: CPT

## 2019-03-24 RX ORDER — OXYCODONE HYDROCHLORIDE 5 MG/1
10 TABLET ORAL EVERY 6 HOURS
Qty: 0 | Refills: 0 | Status: DISCONTINUED | OUTPATIENT
Start: 2019-03-24 | End: 2019-03-26

## 2019-03-24 RX ORDER — OXYCODONE HYDROCHLORIDE 5 MG/1
5 TABLET ORAL EVERY 6 HOURS
Qty: 0 | Refills: 0 | Status: DISCONTINUED | OUTPATIENT
Start: 2019-03-24 | End: 2019-03-26

## 2019-03-24 RX ORDER — IBUPROFEN 200 MG
400 TABLET ORAL EVERY 6 HOURS
Qty: 0 | Refills: 0 | Status: DISCONTINUED | OUTPATIENT
Start: 2019-03-24 | End: 2019-03-26

## 2019-03-24 RX ADMIN — DEXTROSE MONOHYDRATE, SODIUM CHLORIDE, AND POTASSIUM CHLORIDE 100 MILLILITER(S): 50; .745; 4.5 INJECTION, SOLUTION INTRAVENOUS at 11:42

## 2019-03-24 RX ADMIN — HYDROMORPHONE HYDROCHLORIDE 1 MILLIGRAM(S): 2 INJECTION INTRAMUSCULAR; INTRAVENOUS; SUBCUTANEOUS at 19:01

## 2019-03-24 RX ADMIN — DEXTROSE MONOHYDRATE, SODIUM CHLORIDE, AND POTASSIUM CHLORIDE 50 MILLILITER(S): 50; .745; 4.5 INJECTION, SOLUTION INTRAVENOUS at 18:40

## 2019-03-24 RX ADMIN — HYDROMORPHONE HYDROCHLORIDE 1 MILLIGRAM(S): 2 INJECTION INTRAMUSCULAR; INTRAVENOUS; SUBCUTANEOUS at 05:28

## 2019-03-24 RX ADMIN — ENOXAPARIN SODIUM 40 MILLIGRAM(S): 100 INJECTION SUBCUTANEOUS at 12:42

## 2019-03-24 RX ADMIN — HYDROMORPHONE HYDROCHLORIDE 1 MILLIGRAM(S): 2 INJECTION INTRAMUSCULAR; INTRAVENOUS; SUBCUTANEOUS at 13:10

## 2019-03-24 RX ADMIN — HYDROMORPHONE HYDROCHLORIDE 1 MILLIGRAM(S): 2 INJECTION INTRAMUSCULAR; INTRAVENOUS; SUBCUTANEOUS at 17:31

## 2019-03-24 RX ADMIN — DEXTROSE MONOHYDRATE, SODIUM CHLORIDE, AND POTASSIUM CHLORIDE 50 MILLILITER(S): 50; .745; 4.5 INJECTION, SOLUTION INTRAVENOUS at 19:01

## 2019-03-24 RX ADMIN — HYDROMORPHONE HYDROCHLORIDE 1 MILLIGRAM(S): 2 INJECTION INTRAMUSCULAR; INTRAVENOUS; SUBCUTANEOUS at 04:39

## 2019-03-24 RX ADMIN — HYDROMORPHONE HYDROCHLORIDE 1 MILLIGRAM(S): 2 INJECTION INTRAMUSCULAR; INTRAVENOUS; SUBCUTANEOUS at 12:41

## 2019-03-24 RX ADMIN — Medication 85 MILLIMOLE(S): at 08:42

## 2019-03-24 RX ADMIN — DEXTROSE MONOHYDRATE, SODIUM CHLORIDE, AND POTASSIUM CHLORIDE 100 MILLILITER(S): 50; .745; 4.5 INJECTION, SOLUTION INTRAVENOUS at 08:43

## 2019-03-24 NOTE — PROGRESS NOTE ADULT - SUBJECTIVE AND OBJECTIVE BOX
General Surgery Progress Note    S: Patient examined and seen and bedside. Pain is well controlled with medication, patient tolerating CLD. No other complaints at this time.              O:  Vital Signs Last 24 Hrs  T(C): 36.7 (24 Mar 2019 04:38), Max: 37.1 (23 Mar 2019 22:18)  T(F): 98.1 (24 Mar 2019 04:38), Max: 98.7 (23 Mar 2019 22:18)  HR: 72 (24 Mar 2019 04:38) (66 - 92)  BP: 127/88 (24 Mar 2019 04:38) (117/81 - 129/55)  BP(mean): --  RR: 16 (24 Mar 2019 04:38) (16 - 18)  SpO2: 98% (24 Mar 2019 04:38) (96% - 100%)    I&O's Detail    23 Mar 2019 07:01  -  24 Mar 2019 07:00  --------------------------------------------------------  IN:    sodium chloride 0.45% with potassium chloride 20 mEq/L: 600 mL  Total IN: 600 mL    OUT:  Total OUT: 0 mL    Total NET: 600 mL          MEDICATIONS  (STANDING):  enoxaparin Injectable 40 milliGRAM(s) SubCutaneous daily  sodium chloride 0.45% with potassium chloride 20 mEq/L 1000 milliLiter(s) (100 mL/Hr) IV Continuous <Continuous>    MEDICATIONS  (PRN):  HYDROmorphone  Injectable 0.5 milliGRAM(s) IV Push every 4 hours PRN Moderate Pain (4 - 6)  HYDROmorphone  Injectable 1 milliGRAM(s) IV Push every 6 hours PRN Severe Pain (7 - 10)  ketorolac   Injectable 15 milliGRAM(s) IV Push every 6 hours PRN Mild Pain (1 - 3)                            8.4    6.70  )-----------( 330      ( 24 Mar 2019 06:01 )             29.8       03-24    138  |  103  |  < 2<L>  ----------------------------<  109<H>  4.2   |  24  |  0.45<L>    Ca    7.7<L>      24 Mar 2019 06:01  Phos  0.9     03-24  Mg     2.1     03-24    TPro  6.8  /  Alb  3.1<L>  /  TBili  0.3  /  DBili  < 0.2  /  AST  487<H>  /  ALT  144<H>  /  AlkPhos  130<H>  03-24        Physical Exam:    Gen: NAD  Resp: Breathing comfortably on room air  CV: Normal sinus rhythm  Abd: Soft, ND, mildly tender in epigastrium

## 2019-03-24 NOTE — PROVIDER CONTACT NOTE (CRITICAL VALUE NOTIFICATION) - ACTION/TREATMENT ORDERED:
provider to order supplemental potassium. RN to administer. will drawn repeat BMP at 1300 and continue to monitor
Sodium Phosphate 30mmol IV ordered. Lab to be drawn after infusion is completed. Safety maintained. Will cont monitor

## 2019-03-24 NOTE — PROGRESS NOTE ADULT - ASSESSMENT
Patient is a 28F with recurrent ETOH pancreatitis with 9cm pseudocyst    - Reg diet  - pain control  - CIWA for potential etoh withdrawal  - MRCP to evaluate pancreatic ducts  - Hepatology consult   - F/u LFTs - Now downtrending    [id number]

## 2019-03-24 NOTE — PROVIDER CONTACT NOTE (CRITICAL VALUE NOTIFICATION) - ASSESSMENT
Patient is A&Ox 4, denies pain/SOB. VS stable, no s/s of distress noted. Out of bed, ambulating independently. CIWA scoring 0

## 2019-03-25 ENCOUNTER — TRANSCRIPTION ENCOUNTER (OUTPATIENT)
Age: 29
End: 2019-03-25

## 2019-03-25 LAB
ALBUMIN SERPL ELPH-MCNC: 3.1 G/DL — LOW (ref 3.3–5)
ALP SERPL-CCNC: 135 U/L — HIGH (ref 40–120)
ALT FLD-CCNC: 100 U/L — HIGH (ref 4–33)
ANION GAP SERPL CALC-SCNC: 11 MMO/L — SIGNIFICANT CHANGE UP (ref 7–14)
APTT BLD: 27.7 SEC — SIGNIFICANT CHANGE UP (ref 27.5–36.3)
AST SERPL-CCNC: 159 U/L — HIGH (ref 4–32)
BILIRUB DIRECT SERPL-MCNC: < 0.2 MG/DL — SIGNIFICANT CHANGE UP (ref 0.1–0.2)
BILIRUB SERPL-MCNC: 0.3 MG/DL — SIGNIFICANT CHANGE UP (ref 0.2–1.2)
BUN SERPL-MCNC: < 2 MG/DL — LOW (ref 7–23)
CALCIUM SERPL-MCNC: 8 MG/DL — LOW (ref 8.4–10.5)
CHLORIDE SERPL-SCNC: 105 MMOL/L — SIGNIFICANT CHANGE UP (ref 98–107)
CO2 SERPL-SCNC: 23 MMOL/L — SIGNIFICANT CHANGE UP (ref 22–31)
CREAT SERPL-MCNC: 0.48 MG/DL — LOW (ref 0.5–1.3)
GLUCOSE SERPL-MCNC: 132 MG/DL — HIGH (ref 70–99)
HCT VFR BLD CALC: 27 % — LOW (ref 34.5–45)
HGB BLD-MCNC: 7.8 G/DL — LOW (ref 11.5–15.5)
INR BLD: 1.09 — SIGNIFICANT CHANGE UP (ref 0.88–1.17)
LIDOCAIN IGE QN: 90.9 U/L — HIGH (ref 7–60)
MAGNESIUM SERPL-MCNC: 2 MG/DL — SIGNIFICANT CHANGE UP (ref 1.6–2.6)
MCHC RBC-ENTMCNC: 25.7 PG — LOW (ref 27–34)
MCHC RBC-ENTMCNC: 28.9 % — LOW (ref 32–36)
MCV RBC AUTO: 89.1 FL — SIGNIFICANT CHANGE UP (ref 80–100)
NRBC # FLD: 0 K/UL — LOW (ref 25–125)
PHOSPHATE SERPL-MCNC: 2.4 MG/DL — LOW (ref 2.5–4.5)
PLATELET # BLD AUTO: 307 K/UL — SIGNIFICANT CHANGE UP (ref 150–400)
PMV BLD: 11 FL — SIGNIFICANT CHANGE UP (ref 7–13)
POTASSIUM SERPL-MCNC: 4.2 MMOL/L — SIGNIFICANT CHANGE UP (ref 3.5–5.3)
POTASSIUM SERPL-SCNC: 4.2 MMOL/L — SIGNIFICANT CHANGE UP (ref 3.5–5.3)
PROT SERPL-MCNC: 6.8 G/DL — SIGNIFICANT CHANGE UP (ref 6–8.3)
PROTHROM AB SERPL-ACNC: 12.1 SEC — SIGNIFICANT CHANGE UP (ref 9.8–13.1)
RBC # BLD: 3.03 M/UL — LOW (ref 3.8–5.2)
RBC # FLD: 20.5 % — HIGH (ref 10.3–14.5)
SODIUM SERPL-SCNC: 139 MMOL/L — SIGNIFICANT CHANGE UP (ref 135–145)
WBC # BLD: 7.44 K/UL — SIGNIFICANT CHANGE UP (ref 3.8–10.5)
WBC # FLD AUTO: 7.44 K/UL — SIGNIFICANT CHANGE UP (ref 3.8–10.5)

## 2019-03-25 PROCEDURE — 74183 MRI ABD W/O CNTR FLWD CNTR: CPT | Mod: 26

## 2019-03-25 PROCEDURE — 99223 1ST HOSP IP/OBS HIGH 75: CPT | Mod: GC

## 2019-03-25 RX ADMIN — OXYCODONE HYDROCHLORIDE 10 MILLIGRAM(S): 5 TABLET ORAL at 04:00

## 2019-03-25 RX ADMIN — DEXTROSE MONOHYDRATE, SODIUM CHLORIDE, AND POTASSIUM CHLORIDE 50 MILLILITER(S): 50; .745; 4.5 INJECTION, SOLUTION INTRAVENOUS at 06:24

## 2019-03-25 RX ADMIN — OXYCODONE HYDROCHLORIDE 10 MILLIGRAM(S): 5 TABLET ORAL at 03:16

## 2019-03-25 RX ADMIN — ENOXAPARIN SODIUM 40 MILLIGRAM(S): 100 INJECTION SUBCUTANEOUS at 11:21

## 2019-03-25 RX ADMIN — OXYCODONE HYDROCHLORIDE 10 MILLIGRAM(S): 5 TABLET ORAL at 12:00

## 2019-03-25 RX ADMIN — Medication 63.75 MILLIMOLE(S): at 15:07

## 2019-03-25 RX ADMIN — OXYCODONE HYDROCHLORIDE 10 MILLIGRAM(S): 5 TABLET ORAL at 20:35

## 2019-03-25 RX ADMIN — OXYCODONE HYDROCHLORIDE 10 MILLIGRAM(S): 5 TABLET ORAL at 19:54

## 2019-03-25 RX ADMIN — OXYCODONE HYDROCHLORIDE 10 MILLIGRAM(S): 5 TABLET ORAL at 11:21

## 2019-03-25 NOTE — DISCHARGE NOTE PROVIDER - NSDCCPCAREPLAN_GEN_ALL_CORE_FT
PRINCIPAL DISCHARGE DIAGNOSIS  Diagnosis: Pancreatitis  Assessment and Plan of Treatment: DIET: Return to your usual diet.  NOTIFY YOUR SURGEON IF: You have any fever (over 100.4 F) or chills, persistent nausea/vomiting, persistent diarrhea, or if your pain is not controlled on your discharge pain medications.  FOLLOW-UP: Please follow up with your primary care physician in one week regarding your hospitalization. Please follow-up with your surgeon, within 7 days following discharge- please call to schedule an appointment. PRINCIPAL DISCHARGE DIAGNOSIS  Diagnosis: Pancreatitis  Assessment and Plan of Treatment: DIET: Low fat diet  NOTIFY YOUR SURGEON IF: You have any fever (over 100.4 F) or chills, persistent nausea/vomiting, persistent diarrhea, or if your pain is not controlled on your discharge pain medications.  FOLLOW-UP: Please follow up with your primary care physician in one week regarding your hospitalization. Please follow-up with your surgeon, within 7 days following discharge- please call to schedule an appointment.

## 2019-03-25 NOTE — PROGRESS NOTE ADULT - SUBJECTIVE AND OBJECTIVE BOX
General Surgery Progress Note    S: Patient examined and seen and bedside. Pain is well controlled with medication, patient tolerating regular low fat diet. No other complaints at this time. Patient's LFTs have been downtrending, still pending MRCP which has been protocoled by radiology.            O:  Vital Signs Last 24 Hrs  T(C): 37 (24 Mar 2019 21:29), Max: 37.1 (24 Mar 2019 16:53)  T(F): 98.6 (24 Mar 2019 21:29), Max: 98.7 (24 Mar 2019 16:53)  HR: 105 (24 Mar 2019 21:29) (72 - 105)  BP: 149/89 (24 Mar 2019 21:29) (125/82 - 149/89)  BP(mean): --  RR: 16 (24 Mar 2019 21:29) (16 - 18)  SpO2: 98% (24 Mar 2019 21:29) (97% - 100%)    I&O's Detail    23 Mar 2019 07:01  -  24 Mar 2019 07:00  --------------------------------------------------------  IN:    sodium chloride 0.45% with potassium chloride 20 mEq/L: 600 mL  Total IN: 600 mL    OUT:  Total OUT: 0 mL    Total NET: 600 mL      24 Mar 2019 07:01  -  25 Mar 2019 00:02  --------------------------------------------------------  IN:    Oral Fluid: 600 mL  Total IN: 600 mL    OUT:  Total OUT: 0 mL    Total NET: 600 mL          MEDICATIONS  (STANDING):  enoxaparin Injectable 40 milliGRAM(s) SubCutaneous daily  sodium chloride 0.45% with potassium chloride 20 mEq/L 1000 milliLiter(s) (50 mL/Hr) IV Continuous <Continuous>    MEDICATIONS  (PRN):  ibuprofen  Tablet. 400 milliGRAM(s) Oral every 6 hours PRN Temp greater or equal to 38C (100.4F), Mild Pain (1 - 3)  oxyCODONE    IR 5 milliGRAM(s) Oral every 6 hours PRN Moderate Pain (4 - 6)  oxyCODONE    IR 10 milliGRAM(s) Oral every 6 hours PRN Severe Pain (7 - 10)                            8.4    6.70  )-----------( 330      ( 24 Mar 2019 06:01 )             29.8       03-24    138  |  103  |  < 2<L>  ----------------------------<  109<H>  4.2   |  24  |  0.45<L>    Ca    7.7<L>      24 Mar 2019 06:01  Phos  3.0     03-24  Mg     2.1     03-24    TPro  6.8  /  Alb  3.1<L>  /  TBili  0.3  /  DBili  < 0.2  /  AST  487<H>  /  ALT  144<H>  /  AlkPhos  130<H>  03-24          Physical Exam:    Gen: NAD  Resp: Breathing comfortably on room air  CV: Normal sinus rhythm  Abd: Soft, ND, mildly tender in epigastrium

## 2019-03-25 NOTE — DISCHARGE NOTE PROVIDER - HOSPITAL COURSE
Patient is a 27 yo F with PMHX of necrotising pancreatitis in 2017 2/2 ETOH presenting to the [facility] ED 3/22/19 with abdominal pain, N/V for 3 days. Since her last episode of pancreatitis she was told not to drink, however she drinks usually 1 glass of wine every night without pain.  3 days prior to admission ago she drank a bottle of wine and multiple shots of vodka.  She has had severe abdominal pain, N/V since.  The pain is worse with eating so she has had minimal PO intake.  The pain is epigastric but radiates down and to her back.  She has not had any episodes of pancreatitis since she was last admitted in 2017.         Admission CT abd/pelvis (3/22): Findings representing acute on chronic pancreatitis with peripancreatic fluid collection adjacent to the tail measuring 9.1 cm.         Patient was admitted to the surgical service, made NPO and started on IV fluids.         3/24: Diet was advanced with good tolerance.         3/25: Patient underwent an MRCP to evaluate pancreatic ducts given elevated LFTs.    MRCP results: _______________        Hepatology was consulted for transaminitis in setting of alcohol use. No acute workup was recommended. Patient's acute hepatitis panel resulted as_________.        Her liver function tests continued to trend down. Pt currently ambulating, voiding, tolerating a regular diet, without pain. Patient is stable for discharge home to follow up as an outpatient, instructed to call to schedule appointment. Patient is a 29 yo F with PMHX of necrotising pancreatitis in 2017 2/2 ETOH presenting to the [facility] ED 3/22/19 with abdominal pain, N/V for 3 days. Since her last episode of pancreatitis she was told not to drink, however she drinks usually 1 glass of wine every night without pain.  3 days prior to admission ago she drank a bottle of wine and multiple shots of vodka.  She has had severe abdominal pain, N/V since.  The pain is worse with eating so she has had minimal PO intake.  The pain is epigastric but radiates down and to her back.  She has not had any episodes of pancreatitis since she was last admitted in 2017.         Admission CT abd/pelvis (3/22): Findings representing acute on chronic pancreatitis with peripancreatic fluid collection adjacent to the tail measuring 9.1 cm.         Patient was admitted to the surgical service, made NPO and started on IV fluids.         3/24: Diet was advanced with good tolerance.         3/25: Patient underwent an MRCP to evaluate pancreatic ducts given elevated LFTs.    MRCP results demonstrated previously seen peripancreatic fluid collection, not amenable to drainage.         Hepatology was consulted for transaminitis in setting of alcohol use. No acute workup was recommended..        Her liver function tests continued to trend down. Pt currently ambulating, voiding, tolerating a regular diet, without pain. Patient is stable for discharge home to follow up as an outpatient, instructed to call to schedule appointment. Patient is a 29 yo F with PMHX of necrotising pancreatitis in 2017 2/2 ETOH presenting to the [facility] ED 3/22/19 with abdominal pain, N/V for 3 days. Since her last episode of pancreatitis she was told not to drink, however she drinks usually 1 glass of wine every night without pain.  3 days prior to admission ago she drank a bottle of wine and multiple shots of vodka.  She has had severe abdominal pain, N/V since.  The pain is worse with eating so she has had minimal PO intake.  The pain is epigastric but radiates down and to her back.  She has not had any episodes of pancreatitis since she was last admitted in 2017.         Admission CT abd/pelvis (3/22): Findings representing acute on chronic pancreatitis with peripancreatic fluid collection adjacent to the tail measuring 9.1 cm.         Patient was admitted to the surgical service, made NPO and started on IV fluids.         3/24: Diet was advanced with good tolerance.         3/25: Patient underwent an MRCP to evaluate pancreatic ducts given elevated LFTs.    MRCP results demonstrated previously seen peripancreatic fluid collection, not amenable to drainage. MRCP for possible disconnected duct syndrome - negative.         Hepatology was consulted for transaminitis in setting of alcohol use. No acute workup was recommended..        Her liver function tests continued to trend down. Pt currently ambulating, voiding, tolerating a regular diet, without pain. Patient is stable for discharge home to follow up as an outpatient, instructed to call to schedule appointment.

## 2019-03-25 NOTE — CONSULT NOTE ADULT - ASSESSMENT
Impression:  1) Elevated liver enzymes- Differential diagnosis includes DILI (reports use of Tylenol prior to admission), inflammation related to an acute episode of pancreatitis or alcohol hepatitis with a DF of 0.8, acute viral hepatitis, autoimmune hepatitis  2) Acute on chronic pancreatitis    Recommendations:  -Check acute hepatitis panel  -Avoid hepatotoxic agents  -Trend CBC, INR, CMP  -No role for prednisolone at this time given improvement in liver enzymes and a low DF  -Would not perform other work up (autoimmune labs etc) at this point as liver enzyme trends are improving Impression:  1) Elevated liver enzymes- Differential diagnosis includes DILI (reports use of Tylenol prior to admission), inflammation related to an acute episode of pancreatitis or alcohol hepatitis with a DF of 0.8, acute viral hepatitis, autoimmune hepatitis  2) Acute on chronic pancreatitis with 9 cm pseudocyst, no signs of infection    Recommendations:  -Check acute hepatitis panel/complete workup for chronic liver disease  -Avoid hepatotoxic agents  -Trend CBC, INR, CMP  -No role for prednisolone at this time given improvement in liver enzymes and a low DF  -check the creatine kinase level

## 2019-03-25 NOTE — DISCHARGE NOTE PROVIDER - NSDCFUADDINST_GEN_ALL_CORE_FT
Please follow-up with your surgeon, Dr. Ge within 7 days following discharge- please call to schedule an appointment. Please follow-up with your surgeon, Dr. Marx within 7 days following discharge- please call to schedule an appointment.

## 2019-03-25 NOTE — CONSULT NOTE ADULT - SUBJECTIVE AND OBJECTIVE BOX
Chief Complaint:  Patient is a 28y old  Female who presents with a chief complaint of pancreatitis (25 Mar 2019 00:01)      HPI:  27yo F PMH necrotizing pancreatitis in the past 2017 2/2 Etoh pancreatitis, previous episodes 7-8 years ago presents  with abdominal pain, nausea, vomiting for the past 3 days. Pt was found to have acute on chronic pancreatitis in the setting of restarting Alcohol consumption. Pt reports drinking 5-6 drinks (martini with a few shots) for the past week. 3 days prior to admission she reportedly drank a bottle of wine and multiple shots of vodka and has had severe epigastric pain, nausea and vomiting ever since. Pain radiates to the back. Here patient was given aggressive IVF, pain control. CT here showed acute on chronic pancreatitis with peripancreatic fluid collection adjacent to tail that is 9.1cm. Hepatology is consulted for elevated transminases in the 1000s, now downtrending. Per patient, she denies ever being told she has a liver problem. Pt denies needle stick injury, recent blood transfusion, but endorses using finishing a bottle of Tylenol (32 pills in it) in a week for her pain. Pt also recently travelled to [place] 2 months ago. Pt otherwise denies use of new medications, herbal products, chinese medicine.     Allergies:  No Known Allergies      Home Medications:    Hospital Medications:  enoxaparin Injectable 40 milliGRAM(s) SubCutaneous daily  ibuprofen  Tablet. 400 milliGRAM(s) Oral every 6 hours PRN  oxyCODONE    IR 5 milliGRAM(s) Oral every 6 hours PRN  oxyCODONE    IR 10 milliGRAM(s) Oral every 6 hours PRN  sodium chloride 0.45% with potassium chloride 20 mEq/L 1000 milliLiter(s) IV Continuous <Continuous>      PMHX/PSHX:  Pancreatitis  Alcohol abuse, daily use  Ectopic pregnancy  S/P ectopic pregnancy      Family history:  No pertinent family history in first degree relatives      There is no family history of peptic ulcer disease, gastric cancer, colon polyps, colon cancer, celiac disease, biliary, hepatic, or pancreatic disease.  None of the female relatives have breast, uterine, or ovarian cancer.     Social History:     ROS:     General:  No wt loss, fevers, chills, night sweats, fatigue,   Eyes:  Good vision, no reported pain  ENT:  No sore throat, pain, runny nose, dysphagia  CV:  No pain, palpitations, hypo/hypertension  Resp:  No dyspnea, cough, tachypnea, wheezing  GI:  See HPI   :  No pain, bleeding, incontinence, nocturia  Muscle:  No pain, weakness  Neuro:  No weakness, tingling, memory problems  Psych:  No fatigue, insomnia, mood problems, depression  Endocrine:  No polyuria, polydipsia, cold/heat intolerance  Heme:  No petechiae, ecchymosis, easy bruisability  Skin:  No rash, tattoos, scars, edema      PHYSICAL EXAM:     GENERAL:  Appears stated age, well-groomed  HEENT:  NC/AT,  conjunctivae clear and pink, no thyromegaly  CHEST:  Full & symmetric excursion, no increased effort, breath sounds clear  HEART:  Regular rhythm, S1, S2, no murmur/rub/S3/S4  ABDOMEN:  Soft, non-tender, non-distended, normoactive bowel sounds  EXTEREMITIES:  no cyanosis,clubbing or edema  SKIN:  No rash/erythema/ecchymoses/petechiae  NEURO:  Alert, oriented, no asterixis    Vital Signs:  Vital Signs Last 24 Hrs  T(C): 36.8 (25 Mar 2019 06:26), Max: 37.1 (24 Mar 2019 16:53)  T(F): 98.3 (25 Mar 2019 06:26), Max: 98.7 (24 Mar 2019 16:53)  HR: 79 (25 Mar 2019 06:26) (79 - 105)  BP: 132/91 (25 Mar 2019 06:26) (125/82 - 151/99)  BP(mean): --  RR: 16 (25 Mar 2019 06:26) (16 - 18)  SpO2: 100% (25 Mar 2019 06:26) (98% - 100%)  Daily     Daily Weight in k.4 (25 Mar 2019 01:50)    LABS:                        7.8    7.44  )-----------( 307      ( 25 Mar 2019 06:04 )             27.0     Mean Cell Volume: 89.1 fL (- @ 06:04)        139  |  105  |  < 2<L>  ----------------------------<  132<H>  4.2   |  23  |  0.48<L>    Ca    8.0<L>      25 Mar 2019 06:04  Phos  2.4     -  Mg     2.0     -    TPro  6.8  /  Alb  3.1<L>  /  TBili  0.3  /  DBili  < 0.2  /  AST  159<H>  /  ALT  100<H>  /  AlkPhos  135<H>  -25    LIVER FUNCTIONS - ( 25 Mar 2019 06:04 )  Alb: 3.1 g/dL / Pro: 6.8 g/dL / ALK PHOS: 135 u/L / ALT: 100 u/L / AST: 159 u/L / GGT: x           PT/INR - ( 25 Mar 2019 06:04 )   PT: 12.1 SEC;   INR: 1.09          PTT - ( 25 Mar 2019 06:04 )  PTT:27.7 SEC    Amylase Serum--      Lipase serum90.9       Ammonia--                          7.8    7.44  )-----------( 307      ( 25 Mar 2019 06:04 )             27.0                         8.4    6.70  )-----------( 330      ( 24 Mar 2019 06:01 )             29.8                         7.8    6.36  )-----------( 308      ( 23 Mar 2019 05:15 )             27.3                         8.6    7.15  )-----------( 350      ( 22 Mar 2019 12:59 )             30.2     Imaging:    < from: CT Abdomen and Pelvis w/ IV Cont (19 @ 15:49) >  FINDINGS:    LOWER CHEST: Within normal limits.    LIVER: Within normal limits.  BILE DUCTS: Normal caliber.  GALLBLADDER: Within normal limits.  SPLEEN: Within normal limits.  PANCREAS: Overall heterogeneous enhancement of the pancreas with   peripancreatic stranding concerning for acute pancreatitis. Addition,   there appears to be dilatation of the pancreatic duct within the tail   measuring up to 5 mm (series 2 image 34) likely related to chronic   pancreatitis. Additional cystic foci may represent dilated side branches.   There is a fluid collection adjacent to the pancreatic tail measuring 9.1   x 7.6 x 8.9 cm (series 2 image 22 and series 602 image 42).  ADRENALS: Within normal limits.  KIDNEYS/URETERS: No hydronephrosis.    BLADDER: Within normal limits.  REPRODUCTIVE ORGANS: Unremarkable CT appearance of uterus and adnexa.    BOWEL: No bowel obstruction. Appendix is normal.  PERITONEUM: No ascites.  VESSELS:  Normal caliber abdominal aorta. Patent portal vein, SMV and   splenic vein which is attenuated distally.  RETROPERITONEUM: No lymphadenopathy.    ABDOMINAL WALL: Within normal limits.  BONES: Within normal limits.    IMPRESSION:     Findings representing acute on chronic pancreatitis with peripancreatic   fluid collection adjacent to the tail measuring 9.1 cm.                    < end of copied text >

## 2019-03-25 NOTE — PROGRESS NOTE ADULT - ASSESSMENT
Patient is a 28F with recurrent ETOH pancreatitis with 9cm pseudocyst    - Reg diet - Low Fat  - pain control  - CIWA for potential etoh withdrawal  - MRCP to evaluate pancreatic ducts  - f/u Hepatology consult   - F/u LFTs - Now downtrending    [id number]

## 2019-03-25 NOTE — DISCHARGE NOTE PROVIDER - CARE PROVIDER_API CALL
Jacinto Ge)  Surgery; Surgical Critical Care  [address] [address] [place] [address]  Phone: [phone]  Fax: [phone]  Follow Up Time: Patricia Marx (MD)  Surgery  [address] [facility], [address] [id number]  Phone: [phone]  Fax: [phone]  Follow Up Time:

## 2019-03-25 NOTE — DISCHARGE NOTE PROVIDER - CARE PROVIDERS DIRECT ADDRESSES
,[email or web address] ,[email or web address]

## 2019-03-26 ENCOUNTER — TRANSCRIPTION ENCOUNTER (OUTPATIENT)
Age: 29
End: 2019-03-26

## 2019-03-26 VITALS
OXYGEN SATURATION: 100 % | DIASTOLIC BLOOD PRESSURE: 87 MMHG | TEMPERATURE: 98 F | HEART RATE: 93 BPM | SYSTOLIC BLOOD PRESSURE: 130 MMHG | RESPIRATION RATE: 18 BRPM

## 2019-03-26 LAB
ALBUMIN SERPL ELPH-MCNC: 3.3 G/DL — SIGNIFICANT CHANGE UP (ref 3.3–5)
ALP SERPL-CCNC: 135 U/L — HIGH (ref 40–120)
ALT FLD-CCNC: 73 U/L — HIGH (ref 4–33)
ANION GAP SERPL CALC-SCNC: 12 MMO/L — SIGNIFICANT CHANGE UP (ref 7–14)
AST SERPL-CCNC: 69 U/L — HIGH (ref 4–32)
BILIRUB DIRECT SERPL-MCNC: < 0.2 MG/DL — SIGNIFICANT CHANGE UP (ref 0.1–0.2)
BILIRUB SERPL-MCNC: 0.3 MG/DL — SIGNIFICANT CHANGE UP (ref 0.2–1.2)
BUN SERPL-MCNC: 2 MG/DL — LOW (ref 7–23)
CALCIUM SERPL-MCNC: 8.2 MG/DL — LOW (ref 8.4–10.5)
CHLORIDE SERPL-SCNC: 102 MMOL/L — SIGNIFICANT CHANGE UP (ref 98–107)
CO2 SERPL-SCNC: 22 MMOL/L — SIGNIFICANT CHANGE UP (ref 22–31)
CREAT SERPL-MCNC: 0.5 MG/DL — SIGNIFICANT CHANGE UP (ref 0.5–1.3)
GLUCOSE SERPL-MCNC: 104 MG/DL — HIGH (ref 70–99)
HAV IGM SER-ACNC: NONREACTIVE — SIGNIFICANT CHANGE UP
HBV CORE IGM SER-ACNC: NONREACTIVE — SIGNIFICANT CHANGE UP
HBV SURFACE AG SER-ACNC: NONREACTIVE — SIGNIFICANT CHANGE UP
HCT VFR BLD CALC: 28.9 % — LOW (ref 34.5–45)
HCV AB S/CO SERPL IA: 0.14 S/CO — SIGNIFICANT CHANGE UP (ref 0–0.79)
HCV AB SERPL-IMP: SIGNIFICANT CHANGE UP
HGB BLD-MCNC: 8.2 G/DL — LOW (ref 11.5–15.5)
LIDOCAIN IGE QN: 49 U/L — SIGNIFICANT CHANGE UP (ref 7–60)
MAGNESIUM SERPL-MCNC: 1.8 MG/DL — SIGNIFICANT CHANGE UP (ref 1.6–2.6)
MCHC RBC-ENTMCNC: 25.9 PG — LOW (ref 27–34)
MCHC RBC-ENTMCNC: 28.4 % — LOW (ref 32–36)
MCV RBC AUTO: 91.5 FL — SIGNIFICANT CHANGE UP (ref 80–100)
NRBC # FLD: 0 K/UL — SIGNIFICANT CHANGE UP (ref 0–0)
PHOSPHATE SERPL-MCNC: 3 MG/DL — SIGNIFICANT CHANGE UP (ref 2.5–4.5)
PLATELET # BLD AUTO: 334 K/UL — SIGNIFICANT CHANGE UP (ref 150–400)
PMV BLD: 11.3 FL — SIGNIFICANT CHANGE UP (ref 7–13)
POTASSIUM SERPL-MCNC: 3.2 MMOL/L — LOW (ref 3.5–5.3)
POTASSIUM SERPL-SCNC: 3.2 MMOL/L — LOW (ref 3.5–5.3)
PROT SERPL-MCNC: 7.2 G/DL — SIGNIFICANT CHANGE UP (ref 6–8.3)
RBC # BLD: 3.16 M/UL — LOW (ref 3.8–5.2)
RBC # FLD: 20.6 % — HIGH (ref 10.3–14.5)
SODIUM SERPL-SCNC: 136 MMOL/L — SIGNIFICANT CHANGE UP (ref 135–145)
WBC # BLD: 7.18 K/UL — SIGNIFICANT CHANGE UP (ref 3.8–10.5)
WBC # FLD AUTO: 7.18 K/UL — SIGNIFICANT CHANGE UP (ref 3.8–10.5)

## 2019-03-26 PROCEDURE — 99232 SBSQ HOSP IP/OBS MODERATE 35: CPT | Mod: GC

## 2019-03-26 RX ORDER — MAGNESIUM SULFATE 500 MG/ML
2 VIAL (ML) INJECTION ONCE
Qty: 0 | Refills: 0 | Status: COMPLETED | OUTPATIENT
Start: 2019-03-26 | End: 2019-03-26

## 2019-03-26 RX ORDER — OXYCODONE HYDROCHLORIDE 5 MG/1
1 TABLET ORAL
Qty: 18 | Refills: 0
Start: 2019-03-26 | End: 2019-03-28

## 2019-03-26 RX ORDER — IBUPROFEN 200 MG
1 TABLET ORAL
Qty: 0 | Refills: 0 | DISCHARGE
Start: 2019-03-26

## 2019-03-26 RX ORDER — POTASSIUM CHLORIDE 20 MEQ
40 PACKET (EA) ORAL ONCE
Qty: 0 | Refills: 0 | Status: COMPLETED | OUTPATIENT
Start: 2019-03-26 | End: 2019-03-26

## 2019-03-26 RX ADMIN — OXYCODONE HYDROCHLORIDE 10 MILLIGRAM(S): 5 TABLET ORAL at 10:14

## 2019-03-26 RX ADMIN — Medication 50 GRAM(S): at 09:00

## 2019-03-26 RX ADMIN — OXYCODONE HYDROCHLORIDE 10 MILLIGRAM(S): 5 TABLET ORAL at 02:30

## 2019-03-26 RX ADMIN — Medication 40 MILLIEQUIVALENT(S): at 09:00

## 2019-03-26 RX ADMIN — OXYCODONE HYDROCHLORIDE 10 MILLIGRAM(S): 5 TABLET ORAL at 03:30

## 2019-03-26 RX ADMIN — OXYCODONE HYDROCHLORIDE 10 MILLIGRAM(S): 5 TABLET ORAL at 10:44

## 2019-03-26 NOTE — PROGRESS NOTE ADULT - SUBJECTIVE AND OBJECTIVE BOX
Patient is a 28y old  Female who presents with a chief complaint of pancreatitis (26 Mar 2019 00:00)      SUBJECTIVE / OVERNIGHT EVENTS:  no events, denies pain    MEDICATIONS  (STANDING):  enoxaparin Injectable 40 milliGRAM(s) SubCutaneous daily    MEDICATIONS  (PRN):  ibuprofen  Tablet. 400 milliGRAM(s) Oral every 6 hours PRN Temp greater or equal to 38C (100.4F), Mild Pain (1 - 3)  oxyCODONE    IR 5 milliGRAM(s) Oral every 6 hours PRN Moderate Pain (4 - 6)  oxyCODONE    IR 10 milliGRAM(s) Oral every 6 hours PRN Severe Pain (7 - 10)              PHYSICAL EXAM:  GENERAL: NAD, well-developed  HEAD:  Atraumatic, Normocephalic  EYES: EOMI, PERRLA, conjunctiva and sclera anicteric  NECK: Supple, No JVD  CHEST/LUNG: Clear to auscultation bilaterally; No wheeze  HEART: Regular rate and rhythm; No murmurs, rubs, or gallops  ABDOMEN: Soft, Nontender, Nondistended; Bowel sounds present, no hepatosplenomegaly, no rebound or guarding  EXTREMITIES:  2+ Peripheral Pulses, No clubbing, cyanosis, or edema  PSYCH: AAOx3  NEUROLOGY: non-focal, no asterixis  SKIN: No rashes or lesion    LABS:                        8.2    7.18  )-----------( 334      ( 26 Mar 2019 06:14 )             28.9     03-26    136  |  102  |  2<L>  ----------------------------<  104<H>  3.2<L>   |  22  |  0.50    Ca    8.2<L>      26 Mar 2019 06:14  Phos  3.0     03-26  Mg     1.8     03-26    TPro  7.2  /  Alb  3.3  /  TBili  0.3  /  DBili  < 0.2  /  AST  69<H>  /  ALT  73<H>  /  AlkPhos  135<H>  03-26    LIVER FUNCTIONS - ( 26 Mar 2019 06:14 )  Alb: 3.3 g/dL / Pro: 7.2 g/dL / ALK PHOS: 135 u/L / ALT: 73 u/L / AST: 69 u/L / GGT: x           PT/INR - ( 25 Mar 2019 06:04 )   PT: 12.1 SEC;   INR: 1.09          PTT - ( 25 Mar 2019 06:04 )  PTT:27.7 SEC          RADIOLOGY & ADDITIONAL TESTS:

## 2019-03-26 NOTE — PROGRESS NOTE ADULT - ATTENDING COMMENTS
I have personally interviewed and examined this patient, reviewed pertinent labs and imaging, and discussed the case with colleagues, residents, and physician assistants on B Team rounds.    Plan  mrcp  ok for trial of low fat diet  [facility] protocol  hepatology consult for transaminitis in setting of etoh      The Acute Care Surgery (B Team) Attending Group Practice:  Dr. Gaetano Dia, Dr. Mel Ascencio, Dr. Cem Ward, Dr. Patricia Marx, Dr. Jacinto Ge    urgent issues - spectra [phone] or [phone]  nonurgent issues - [phone]  patient appointments or afterhours - [phone]
I saw and examined the patient and agree with the above note.    ETOH induced pancreatitis  -exam is nontender, likely resolved inflammation. Pseudocyst will likely form  -MRCP for possible disconnected duct syndrome  -appreciate GI recs - hepatitis panel to be ordered  -regular diet  -social work    I spent 25min reviewing history, data, images and in discussion/coordination of care. Greater than 50% of my time was spent in face-to-face discussion with the patient regarding current plan and follow up.    Jacinto Ge MD (Cell: [phone])  Acute and Critical Care Surgery    The Acute Care Surgery (B Team) Attending Group Practice:  Dr. Mel Ascencio, Dr. Cem Ward, Dr. Patricia Marx, Dr. Jacinto Ge    Urgent issues - spectra [phone] or [phone]  Nonurgent issues - [phone]  Patient appointments or after hours - [phone]
I saw and examined the patient and agree with the above note.    ETOH induced pancreatitis w/ pseudocyst formation  -exam is nontender, likely resolved inflammation. Pseudocyst will likely form a thicker wall in the future for possible intervention  -MRCP for possible disconnected duct syndrome - negative  -appreciate GI recs   -regular diet  -social work  -d/c home    I spent 25min reviewing history, data, images and in discussion/coordination of care. Greater than 50% of my time was spent in face-to-face discussion with the patient regarding current plan and follow up.    Jacinto Ge MD (Cell: [phone])  Acute and Critical Care Surgery    The Acute Care Surgery (B Team) Attending Group Practice:  Dr. Mel Ascencio, Dr. Cem Ward, Dr. Patricia Marx, Dr. Jacinto Ge    Urgent issues - spectra [phone] or [phone]  Nonurgent issues - [phone]  Patient appointments or after hours - [phone] .
Patient was seen and examined with Hepatology team at rounds. Agree with above.

## 2019-03-26 NOTE — PROGRESS NOTE ADULT - ASSESSMENT
Patient is a 28F with recurrent ETOH pancreatitis with 9cm pseudocyst.     - Appreciate Hepatology consult: to trend LFTs, obtain hepatitis panel.   - F/u MRCP read.   - LRD as tolerated.  - CIWA for potential etoh withdrawal  - Follow up AM labs.   - Dispo: possible home today.     B Team Surgery #[id number]

## 2019-03-26 NOTE — DISCHARGE NOTE NURSING/CASE MANAGEMENT/SOCIAL WORK - NSDCDPATPORTLINK_GEN_ALL_CORE
You can access the [facility] Patient [facility], offered by [facility], by registering with the following website: [email or web address]

## 2019-03-26 NOTE — PROGRESS NOTE ADULT - ASSESSMENT
Impression:  1) Elevated liver enzymes- Differential diagnosis includes DILI (reports use of Tylenol prior to admission),rhabdomyolysis, ischemic hepatopathy or viral related to an acute episode of pancreatitis or alcohol hepatitis with a DF of 0.8, acute viral hepatitis, autoimmune hepatitis. Rapid resolution of chemistry abnormalities.  2) Acute on chronic pancreatitis with 9 cm pseudocyst, no signs of infection, asymptomatic.  3) anemia without overt GI bleeding, likely d/t ethanol    Recommendations:  -follow up up viral hepatitis levels, can be done as outpatient, no hepatological contraindication to hospital d/c Impression:  1) Elevated liver enzymes- Differential diagnosis includes DILI (reports use of Tylenol prior to admission),rhabdomyolysis, ischemic hepatopathy or viral related to an acute episode of pancreatitis or alcohol hepatitis with a DF of 0.8, acute viral hepatitis, autoimmune hepatitis. Rapid resolution of chemistry abnormalities.  2) Acute on chronic pancreatitis with 9 cm pseudocyst, no signs of infection, asymptomatic.  3) anemia without overt GI bleeding, likely d/t ethanol    Recommendations:  -follow up up viral hepatitis levels, can be done as outpatient, no hepatological contraindication to hospital d/c  -social work must see patient to discuss ethanol cessation resources, she wants to stop drinking

## 2019-03-26 NOTE — PROGRESS NOTE ADULT - SUBJECTIVE AND OBJECTIVE BOX
Surgery Progress Note     Subjective/24hour Events:   Patient seen and examined.   No acute events overnight.   Pain improved.   Tolerating diet without N/V.     Vital Signs:  Vital Signs Last 24 Hrs  T(C): 36.9 (25 Mar 2019 16:42), Max: 37.1 (25 Mar 2019 01:50)  T(F): 98.4 (25 Mar 2019 16:42), Max: 98.7 (25 Mar 2019 01:50)  HR: 76 (25 Mar 2019 16:42) (76 - 82)  BP: 133/90 (25 Mar 2019 16:42) (130/88 - 151/99)  BP(mean): --  RR: 20 (25 Mar 2019 16:42) (16 - 20)  SpO2: 98% (25 Mar 2019 16:42) (98% - 100%)    CAPILLARY BLOOD GLUCOSE          I&O's Detail    24 Mar 2019 07:01  -  25 Mar 2019 07:00  --------------------------------------------------------  IN:    Oral Fluid: 600 mL    sodium chloride 0.45% with potassium chloride 20 mEq/L: 300 mL  Total IN: 900 mL    OUT:    Voided: 450 mL  Total OUT: 450 mL    Total NET: 450 mL      25 Mar 2019 07:01  -  26 Mar 2019 00:00  --------------------------------------------------------  IN:  Total IN: 0 mL    OUT:    Voided: 750 mL  Total OUT: 750 mL    Total NET: -750 mL          MEDICATIONS  (STANDING):  enoxaparin Injectable 40 milliGRAM(s) SubCutaneous daily    MEDICATIONS  (PRN):  ibuprofen  Tablet. 400 milliGRAM(s) Oral every 6 hours PRN Temp greater or equal to 38C (100.4F), Mild Pain (1 - 3)  oxyCODONE    IR 5 milliGRAM(s) Oral every 6 hours PRN Moderate Pain (4 - 6)  oxyCODONE    IR 10 milliGRAM(s) Oral every 6 hours PRN Severe Pain (7 - 10)      Physical Exam:  Gen: NAD.  Lungs: Non labored breathing.   Ab: Soft, minimally tender, nondistended.   Ext: Moves all 4 spontaneously.     Labs:    03-25    139  |  105  |  < 2<L>  ----------------------------<  132<H>  4.2   |  23  |  0.48<L>    Ca    8.0<L>      25 Mar 2019 06:04  Phos  2.4     03-25  Mg     2.0     03-25    TPro  6.8  /  Alb  3.1<L>  /  TBili  0.3  /  DBili  < 0.2  /  AST  159<H>  /  ALT  100<H>  /  AlkPhos  135<H>  03-25    LIVER FUNCTIONS - ( 25 Mar 2019 06:04 )  Alb: 3.1 g/dL / Pro: 6.8 g/dL / ALK PHOS: 135 u/L / ALT: 100 u/L / AST: 159 u/L / GGT: x                                 7.8    7.44  )-----------( 307      ( 25 Mar 2019 06:04 )             27.0     PT/INR - ( 25 Mar 2019 06:04 )   PT: 12.1 SEC;   INR: 1.09          PTT - ( 25 Mar 2019 06:04 )  PTT:27.7 SEC    Imaging:  MRCP read pending.

## 2019-06-10 ENCOUNTER — RESULT REVIEW (OUTPATIENT)
Age: 29
End: 2019-06-10

## 2019-06-20 ENCOUNTER — RESULT REVIEW (OUTPATIENT)
Age: 29
End: 2019-06-20

## 2020-05-07 ENCOUNTER — EMERGENCY (EMERGENCY)
Facility: HOSPITAL | Age: 30
LOS: 1 days | Discharge: HOME | End: 2020-05-07
Attending: EMERGENCY MEDICINE | Admitting: EMERGENCY MEDICINE
Payer: SELF-PAY

## 2020-05-07 VITALS
SYSTOLIC BLOOD PRESSURE: 171 MMHG | DIASTOLIC BLOOD PRESSURE: 102 MMHG | TEMPERATURE: 99 F | HEART RATE: 118 BPM | RESPIRATION RATE: 18 BRPM | OXYGEN SATURATION: 100 %

## 2020-05-07 VITALS
TEMPERATURE: 98 F | OXYGEN SATURATION: 100 % | DIASTOLIC BLOOD PRESSURE: 74 MMHG | SYSTOLIC BLOOD PRESSURE: 168 MMHG | RESPIRATION RATE: 17 BRPM | HEART RATE: 81 BPM

## 2020-05-07 DIAGNOSIS — Z87.59 PERSONAL HISTORY OF OTHER COMPLICATIONS OF PREGNANCY, CHILDBIRTH AND THE PUERPERIUM: Chronic | ICD-10-CM

## 2020-05-07 LAB
ALBUMIN SERPL ELPH-MCNC: 4.4 G/DL — SIGNIFICANT CHANGE UP (ref 3.3–5)
ALP SERPL-CCNC: 85 U/L — SIGNIFICANT CHANGE UP (ref 40–120)
ALT FLD-CCNC: 19 U/L — SIGNIFICANT CHANGE UP (ref 4–33)
ANION GAP SERPL CALC-SCNC: 15 MMO/L — HIGH (ref 7–14)
APAP SERPL-MCNC: < 15 UG/ML — LOW (ref 15–25)
APTT BLD: 25.6 SEC — LOW (ref 27.5–36.3)
AST SERPL-CCNC: 35 U/L — HIGH (ref 4–32)
BASE EXCESS BLDV CALC-SCNC: -6.1 MMOL/L — SIGNIFICANT CHANGE UP
BASOPHILS # BLD AUTO: 0.07 K/UL — SIGNIFICANT CHANGE UP (ref 0–0.2)
BASOPHILS NFR BLD AUTO: 0.7 % — SIGNIFICANT CHANGE UP (ref 0–2)
BILIRUB SERPL-MCNC: 0.5 MG/DL — SIGNIFICANT CHANGE UP (ref 0.2–1.2)
BLOOD GAS VENOUS - CREATININE: 0.51 MG/DL — SIGNIFICANT CHANGE UP (ref 0.5–1.3)
BLOOD GAS VENOUS - FIO2: 21 — SIGNIFICANT CHANGE UP
BUN SERPL-MCNC: 5 MG/DL — LOW (ref 7–23)
CALCIUM SERPL-MCNC: 9.1 MG/DL — SIGNIFICANT CHANGE UP (ref 8.4–10.5)
CHLORIDE BLDV-SCNC: 109 MMOL/L — HIGH (ref 96–108)
CHLORIDE SERPL-SCNC: 101 MMOL/L — SIGNIFICANT CHANGE UP (ref 98–107)
CO2 SERPL-SCNC: 19 MMOL/L — LOW (ref 22–31)
CREAT SERPL-MCNC: 0.57 MG/DL — SIGNIFICANT CHANGE UP (ref 0.5–1.3)
EOSINOPHIL # BLD AUTO: 0.04 K/UL — SIGNIFICANT CHANGE UP (ref 0–0.5)
EOSINOPHIL NFR BLD AUTO: 0.4 % — SIGNIFICANT CHANGE UP (ref 0–6)
ETHANOL BLD-MCNC: < 10 MG/DL — SIGNIFICANT CHANGE UP
GAS PNL BLDV: 137 MMOL/L — SIGNIFICANT CHANGE UP (ref 136–146)
GLUCOSE BLDV-MCNC: 83 MG/DL — SIGNIFICANT CHANGE UP (ref 70–99)
GLUCOSE SERPL-MCNC: 91 MG/DL — SIGNIFICANT CHANGE UP (ref 70–99)
HCO3 BLDV-SCNC: 20 MMOL/L — SIGNIFICANT CHANGE UP (ref 20–27)
HCT VFR BLD CALC: 31.4 % — LOW (ref 34.5–45)
HCT VFR BLDV CALC: 27.5 % — LOW (ref 34.5–45)
HGB BLD-MCNC: 9.6 G/DL — LOW (ref 11.5–15.5)
HGB BLDV-MCNC: 8.9 G/DL — LOW (ref 11.5–15.5)
IMM GRANULOCYTES NFR BLD AUTO: 0.3 % — SIGNIFICANT CHANGE UP (ref 0–1.5)
INR BLD: 1.07 — SIGNIFICANT CHANGE UP (ref 0.88–1.17)
LACTATE BLDV-MCNC: 1 MMOL/L — SIGNIFICANT CHANGE UP (ref 0.5–2)
LIDOCAIN IGE QN: 16.7 U/L — SIGNIFICANT CHANGE UP (ref 7–60)
LYMPHOCYTES # BLD AUTO: 1.12 K/UL — SIGNIFICANT CHANGE UP (ref 1–3.3)
LYMPHOCYTES # BLD AUTO: 11.4 % — LOW (ref 13–44)
MAGNESIUM SERPL-MCNC: 1.8 MG/DL — SIGNIFICANT CHANGE UP (ref 1.6–2.6)
MCHC RBC-ENTMCNC: 26 PG — LOW (ref 27–34)
MCHC RBC-ENTMCNC: 30.6 % — LOW (ref 32–36)
MCV RBC AUTO: 85.1 FL — SIGNIFICANT CHANGE UP (ref 80–100)
MONOCYTES # BLD AUTO: 0.8 K/UL — SIGNIFICANT CHANGE UP (ref 0–0.9)
MONOCYTES NFR BLD AUTO: 8.2 % — SIGNIFICANT CHANGE UP (ref 2–14)
NEUTROPHILS # BLD AUTO: 7.75 K/UL — HIGH (ref 1.8–7.4)
NEUTROPHILS NFR BLD AUTO: 79 % — HIGH (ref 43–77)
NRBC # FLD: 0 K/UL — SIGNIFICANT CHANGE UP (ref 0–0)
PCO2 BLDV: 28 MMHG — LOW (ref 41–51)
PH BLDV: 7.42 PH — SIGNIFICANT CHANGE UP (ref 7.32–7.43)
PHOSPHATE SERPL-MCNC: 2.3 MG/DL — LOW (ref 2.5–4.5)
PLATELET # BLD AUTO: 502 K/UL — HIGH (ref 150–400)
PMV BLD: 10.7 FL — SIGNIFICANT CHANGE UP (ref 7–13)
PO2 BLDV: 157 MMHG — HIGH (ref 35–40)
POTASSIUM BLDV-SCNC: 3.7 MMOL/L — SIGNIFICANT CHANGE UP (ref 3.4–4.5)
POTASSIUM SERPL-MCNC: 3.9 MMOL/L — SIGNIFICANT CHANGE UP (ref 3.5–5.3)
POTASSIUM SERPL-SCNC: 3.9 MMOL/L — SIGNIFICANT CHANGE UP (ref 3.5–5.3)
PROT SERPL-MCNC: 8.4 G/DL — HIGH (ref 6–8.3)
PROTHROM AB SERPL-ACNC: 12.2 SEC — SIGNIFICANT CHANGE UP (ref 9.8–13.1)
RBC # BLD: 3.69 M/UL — LOW (ref 3.8–5.2)
RBC # FLD: 20.5 % — HIGH (ref 10.3–14.5)
SALICYLATES SERPL-MCNC: < 5 MG/DL — LOW (ref 15–30)
SAO2 % BLDV: 99.7 % — HIGH (ref 60–85)
SODIUM SERPL-SCNC: 135 MMOL/L — SIGNIFICANT CHANGE UP (ref 135–145)
WBC # BLD: 9.81 K/UL — SIGNIFICANT CHANGE UP (ref 3.8–10.5)
WBC # FLD AUTO: 9.81 K/UL — SIGNIFICANT CHANGE UP (ref 3.8–10.5)

## 2020-05-07 PROCEDURE — 74177 CT ABD & PELVIS W/CONTRAST: CPT | Mod: 26

## 2020-05-07 PROCEDURE — 93010 ELECTROCARDIOGRAM REPORT: CPT

## 2020-05-07 PROCEDURE — 99284 EMERGENCY DEPT VISIT MOD MDM: CPT | Mod: 25

## 2020-05-07 RX ORDER — MORPHINE SULFATE 50 MG/1
4 CAPSULE, EXTENDED RELEASE ORAL ONCE
Refills: 0 | Status: DISCONTINUED | OUTPATIENT
Start: 2020-05-07 | End: 2020-05-07

## 2020-05-07 RX ORDER — ONDANSETRON 8 MG/1
4 TABLET, FILM COATED ORAL ONCE
Refills: 0 | Status: COMPLETED | OUTPATIENT
Start: 2020-05-07 | End: 2020-05-07

## 2020-05-07 RX ORDER — ACETAMINOPHEN 500 MG
975 TABLET ORAL ONCE
Refills: 0 | Status: COMPLETED | OUTPATIENT
Start: 2020-05-07 | End: 2020-05-07

## 2020-05-07 RX ORDER — SODIUM CHLORIDE 9 MG/ML
1000 INJECTION INTRAMUSCULAR; INTRAVENOUS; SUBCUTANEOUS ONCE
Refills: 0 | Status: COMPLETED | OUTPATIENT
Start: 2020-05-07 | End: 2020-05-07

## 2020-05-07 RX ADMIN — MORPHINE SULFATE 4 MILLIGRAM(S): 50 CAPSULE, EXTENDED RELEASE ORAL at 18:32

## 2020-05-07 RX ADMIN — Medication 975 MILLIGRAM(S): at 20:24

## 2020-05-07 RX ADMIN — ONDANSETRON 4 MILLIGRAM(S): 8 TABLET, FILM COATED ORAL at 18:32

## 2020-05-07 RX ADMIN — SODIUM CHLORIDE 1000 MILLILITER(S): 9 INJECTION INTRAMUSCULAR; INTRAVENOUS; SUBCUTANEOUS at 18:32

## 2020-05-07 NOTE — ED PROVIDER NOTE - ATTENDING CONTRIBUTION TO CARE
Patient is a 31 yo F with history of ETOH abuse, hx of necrotizing pancreatitis here for epigastric pain and vomiting after drinking a bottle of wine last night. Patient states she had this pain since May 4th, after she had 1 shot of vodka. Since then, she had mild pain and felt better yesterday and then proceeded to drink a bottle of wine. She reports multiple episodes of vomiting this morning. No diarrhea. Denies chest pain, shortness of breath. She took 6 tylenol last night for pain relief. No fevers, chills.     VS noted  Gen. no acute distress, Non toxic   HEENT: EOMI  Lungs: CTAB/L no C/ W /R   CVS: RRR   Abd; Soft, epigastric ttp, voluntary guarding  Ext: no edema  Skin: no rash  Neuro AAOx3 non focal clear speech  a/p: epigastric pain, myalgias - likely exacerbation of pancreatitis vs gastritis. plan for pain control, labs including lipase, CT A/P. Will also check tylenol level given she has been taking over the prescribed amount.   - Bryon KELLEY

## 2020-05-07 NOTE — ED PROVIDER NOTE - CLINICAL SUMMARY MEDICAL DECISION MAKING FREE TEXT BOX
30yF h/o necrotizing pancreatitis 2/2 ETOH (admitted March 2019) presents with constant abd pain and diffuse body ache concern for returning pancreatitis. Will get labs, give fluids, vbg, antiemetics, CT abd/pelvis, pain management and reassess

## 2020-05-07 NOTE — ED PROVIDER NOTE - NS ED ROS FT
GENERAL: No fever or chills, EYES: no change in vision, HEENT: no trouble swallowing or speaking, CARDIAC: no chest pain, PULMONARY: no cough or SOB,   GI: +abdominal pain, +nausea, +vomiting, no diarrhea or constipation, : No changes in urination, SKIN: no rashes, NEURO: no headache,  MSK: No joint pain ~Steve Valero M.D. Resident

## 2020-05-07 NOTE — ED ADULT NURSE NOTE - OBJECTIVE STATEMENT
RN Break COverage: Pt received to intake room 3. Pt c/o "full body pains/aching" & nausea & vomiting starting 5/4. Pt has hx of pancreatitis, stated pain developed after taking "1 shot" of alcohol. Also endorses having a "full bottle of wine" to "help her fall asleep" last night. Pt appears uncomfortable, endorses the most pain to LLQ of abdomen, guarding on exam, and to her spine. Endorses poor PO intake since 5/4 and "feels dehydrated." Denies diarrhea. Also denies fevers, chills, chest pain, dizziness, weakness, headache, dyspnea, palpitations, cough. Respirations are even & unlabored, lung sounds clear, heart sounds normal, abd is soft, non-distended. Pt is A&Ox4, ambulates independently. 20 G IV placed to right AC. Report given to PHILLIP Dominguez.

## 2020-05-07 NOTE — ED ADULT TRIAGE NOTE - CHIEF COMPLAINT QUOTE
Arrives ambulatory reports "unbearable pain everywhere" x 3 days. States she can't even pinpoint where it started. denies fever, cough, SOB. PMH "lung surgery"

## 2020-05-07 NOTE — ED ADULT NURSE NOTE - PAIN RATING/NUMBER SCALE (0-10): ACTIVITY
[facility] pharmacy called requesting test strips, lancets for accu check [employer] Plus.  Also requested to resend order for meter with Dx. code for Medicare.  Refill e scribed per request.   8

## 2020-05-07 NOTE — ED PROVIDER NOTE - OBJECTIVE STATEMENT
30yF h/o necrotizing pancreatitis 2/2 ETOH (admitted March 2019) presents with constant abd pain and diffuse body ache. States that pain started after taking a shot of vodka on May 4th. Did not get any relief with otc meds so drank a bottle of wine to yesterday for some relief which only exacerbated the pain. Endorse nb/nb emesis of 1 day duration but denies fevers, chills, chest pain, sob, urinary or bowel irregularities

## 2020-05-07 NOTE — ED PROVIDER NOTE - PATIENT PORTAL LINK FT
You can access the FollowMyHealth Patient Portal offered by [facility] by registering at the following website: [email or web address]. By joining [employer]s FollowMyHealth portal, you will also be able to view your health information using other applications (apps) compatible with our system.

## 2020-05-07 NOTE — ED PROVIDER NOTE - PHYSICAL EXAMINATION
Gen: AAOx3, non-toxic  Head: NCAT  HEENT: EOMI, oral mucosa moist, normal conjunctiva  Lung: CTAB, no respiratory distress, speaking in full sentences  CV: RRR, no murmurs, rubs or gallops  Abd: soft, diffuse ttp (mostly epigastric), voluntarily guarding, no CVA tenderness  MSK: no visible deformities  Neuro: No focal sensory or motor deficits  Skin: Warm, well perfused, no rash  Psych: normal affect.   ~Steve Valero M.D. Resident

## 2020-05-16 NOTE — CONSULT NOTE ADULT - ATTENDING COMMENTS
Patient was seen and examined with GI fellow at rounds. Agree with above.   A 28 year-old woman with history of alcohol abuse, s/p necrotizing alc. pancreatitis in 2017 presented with abdominal pain, ,nausea,  found to have acute on chronic pancreatitis for active alcohol consumption was seen for elevated liver enzymes,  AST peaked at 1500. AST >>> ALT, TB was normal, INR normal.   She denies body injury, but had significant acetaminophen use over 1 week.  She was in [place] 2 months ago.  Abnormal liver tests may be resulted from rhabdomyolysis, pancreatitis, and alcohol associated liver disease, acetaminophen toxicity. or concurrent  acute viral hepatitis.  Will recommend   - trend liver tests and INR.   - workup for acute viral hepatitis  - avoid hepatotoxins
Metoprolol 25 bid, , Gabapentin 300 mg tid, Cogentin 1 mg bid,  depakote 500 mg qd, Flomax 0.4 mg hs, Lexapro 10 mg hs, Lipitor 10 mg daily, Zyprexa 30 mg hs,  ASA 81 mg daily, Creon ER 1200 units bid,  MVI daily, Folic acid 1 mg daily.  ASA 81mg qd, Creon [id number] u tid with mealsVentolin 90 mcg 2 puff Q 6hrs PRN SOB (confirmed with group home fax).

## 2020-08-11 NOTE — PATIENT PROFILE ADULT. - FUNCTIONAL LEVEL PRIOR: EATING
Surgery is scheduled for 08/24/20 arrival time will be given by the the preop nurse.  The preop nurse will call you from [phone]  Nothing to eat or drink after midnight.  Someone to drive you home.    THE PREOP NURSE WILL CALL, SOMETIMES AS LATE AS 4 or 5 PM IN THE AFTERNOON THE DAY BEFORE SURGERY.    Bathe the night before and the morning of your procedure with a Chlorhexidine wash such as Hibiclens, can be purchased at most Pharmacy's no prescription needed.    Special Instruction:  Here is instruction for your colon cleanse the day before your procedure.    Medication to purchase at your pharmacy no need for Prescription:  2 bottles of Magnesium Citrate 10 or 12 ounce bottle will do.  Dulcolax tablets you will need 4 tablets in total.    On the day before the procedure you are on clear liquids all day, no solid food.   You can take your morning medications if you are allowed by your Doctor.  Clear liquid means any nonalcoholic  liquids including Jello and popcicles, juice with no pulp, soft drinks, tea, coffee no creamer, water Gatorade, chicken and/or beef broth.    You can start taking your first laxative starting as early as 8am but try not to start later than 12:00 noon.  First dose will be two Dulcolax tabs followed by 8 ounces clear liquid.  Take 2 more Dulcolax 2 hours after the first dose followed by 8 ounces of clear liquids.  Remember that the laxatives work best when you drink plenty of fluids.    Drink your first bottle of Magnesium Citrate at 5:00pm followed by 5-8ounce glasses of liquid over a 3 hour period.  At 9:00 pm take your 2nd bottle magnesium citrate followed by 3 more 8ounce glasses of clear liquid.  After Midnight nothing else to eat or drink.    Contact the office if you have questions.  Your surgery is scheduled at the [facility] in [address] Martinez.  
(0) independent

## 2021-11-01 ENCOUNTER — EMERGENCY (EMERGENCY)
Facility: HOSPITAL | Age: 31
LOS: 1 days | Discharge: HOME | End: 2021-11-01
Attending: EMERGENCY MEDICINE
Payer: MEDICAID

## 2021-11-01 VITALS
SYSTOLIC BLOOD PRESSURE: 113 MMHG | TEMPERATURE: 98 F | OXYGEN SATURATION: 99 % | RESPIRATION RATE: 18 BRPM | DIASTOLIC BLOOD PRESSURE: 72 MMHG | HEART RATE: 74 BPM

## 2021-11-01 VITALS
HEIGHT: 62 IN | RESPIRATION RATE: 18 BRPM | WEIGHT: 149.91 LBS | DIASTOLIC BLOOD PRESSURE: 78 MMHG | OXYGEN SATURATION: 98 % | HEART RATE: 82 BPM | TEMPERATURE: 98 F | SYSTOLIC BLOOD PRESSURE: 141 MMHG

## 2021-11-01 DIAGNOSIS — Z87.59 PERSONAL HISTORY OF OTHER COMPLICATIONS OF PREGNANCY, CHILDBIRTH AND THE PUERPERIUM: Chronic | ICD-10-CM

## 2021-11-01 PROCEDURE — 99284 EMERGENCY DEPT VISIT MOD MDM: CPT

## 2021-11-01 PROCEDURE — 72110 X-RAY EXAM L-2 SPINE 4/>VWS: CPT

## 2021-11-01 PROCEDURE — 99283 EMERGENCY DEPT VISIT LOW MDM: CPT | Mod: 25

## 2021-11-01 PROCEDURE — 72110 X-RAY EXAM L-2 SPINE 4/>VWS: CPT | Mod: 26

## 2021-11-01 RX ORDER — ACETAMINOPHEN 500 MG
650 TABLET ORAL ONCE
Refills: 0 | Status: COMPLETED | OUTPATIENT
Start: 2021-11-01 | End: 2021-11-01

## 2021-11-01 RX ADMIN — Medication 650 MILLIGRAM(S): at 11:15

## 2021-11-01 RX ADMIN — Medication 650 MILLIGRAM(S): at 12:15

## 2021-11-01 NOTE — ED ADULT TRIAGE NOTE - CHIEF COMPLAINT QUOTE
angy sent from [facility] c/o back pain x 3 days no trauma/injury . reports no relief from her pain meds

## 2021-11-01 NOTE — ED PROVIDER NOTE - PATIENT PORTAL LINK FT
You can access the FollowMyHealth Patient Portal offered by [facility] by registering at the following website: [email or web address]. By joining [employer]’s FollowMyHealth portal, you will also be able to view your health information using other applications (apps) compatible with our system.

## 2021-11-01 NOTE — ED PROVIDER NOTE - PROGRESS NOTE DETAILS
mali spoke to Dr. Giuseppe Obregon  pt's pmd  he has been working up her lower back pain and ordered outpt xray  will get xray here and if neg dc to follow up with pmd

## 2021-11-01 NOTE — ED PROVIDER NOTE - NSFOLLOWUPINSTRUCTIONS_ED_ALL_ED_FT
Acute Low Back Pain    WHAT YOU NEED TO KNOW:    Acute low back pain is sudden discomfort that lasts up to 6 weeks and makes activity difficult.    DISCHARGE INSTRUCTIONS:    Return to the emergency department if:   •You have severe pain.      •You have sudden stiffness and heaviness on both buttocks down to both legs.      •You have numbness or weakness in one leg, or pain in both legs.      •You have numbness in your genital area or across your lower back.      •You cannot control your urine or bowel movements.      Call your doctor if:   •You have a fever.      •You have pain at night or when you rest.      •Your pain does not get better with treatment.      •You have pain that worsens when you cough or sneeze.      •You suddenly feel something pop or snap in your back.      •You have questions or concerns about your condition or care.      Medicines: You may need any of the following:  •NSAIDs, such as ibuprofen, help decrease swelling, pain, and fever. This medicine is available with or without a doctor's order. NSAIDs can cause stomach bleeding or kidney problems in certain people. If you take blood thinner medicine, always ask your healthcare provider if NSAIDs are safe for you. Always read the medicine label and follow directions.      •Acetaminophen decreases pain and fever. It is available without a doctor's order. Ask how much to take and how often to take it. Follow directions. Read the labels of all other medicines you are using to see if they also contain acetaminophen, or ask your doctor or pharmacist. Acetaminophen can cause liver damage if not taken correctly. Do not use more than 4 grams (4,000 milligrams) total of acetaminophen in one day.       •Muscle relaxers decrease pain by relaxing the muscles in your lower spine.      •Prescription pain medicine may be given. Ask your healthcare provider how to take this medicine safely. Some prescription pain medicines contain acetaminophen. Do not take other medicines that contain acetaminophen without talking to your healthcare provider. Too much acetaminophen may cause liver damage. Prescription pain medicine may cause constipation. Ask your healthcare provider how to prevent or treat constipation.       Self-care:   •Stay active as much as you can without causing more pain. Bed rest could make your back pain worse. Start with some light exercises, such as walking. Avoid heavy lifting until your pain is gone. Ask for more information about the activities or exercises that are right for you.   Family Walking for Exercise           •Apply heat on your back for 20 to 30 minutes every 2 hours for as many days as directed. Heat helps decrease pain and muscle spasms. Alternate heat and ice.      •Apply ice on your back for 15 to 20 minutes every hour or as directed. Use an ice pack, or put crushed ice in a plastic bag. Cover it with a towel before you apply it to your skin. Ice helps prevent tissue damage and decreases swelling and pain.      Prevent acute low back pain:   •Use proper body mechanics. ?Bend at the hips and knees when you  objects. Do not bend from the waist. Use your leg muscles as you lift the load. Do not use your back. Keep the object close to your chest as you lift it. Try not to twist or lift anything above your waist.      ?Change your position often when you stand for long periods of time. Rest one foot on a small box or footrest, and then switch to the other foot often.      ?Try not to sit for long periods of time. When you do, sit in a straight-backed chair with your feet flat on the floor. Never reach, pull, or push while you are sitting.      •Do exercises that strengthen your back muscles. Warm up before you exercise. Ask your healthcare provider the best exercises for you.      •Maintain a healthy weight. Ask your healthcare provider what a healthy weight is for you. Ask him or her to help you create a weight loss plan if you are overweight.      Follow up with your doctor as directed: Return for a follow-up visit if you still have pain after 1 to 3 weeks of treatment. You may need to visit an orthopedist if your back pain lasts longer than 12 weeks. Write down your questions so you remember to ask them during your visits.       © Copyright [employer] 2021

## 2022-06-20 NOTE — PATIENT PROFILE ADULT - NSPROGENARRIVEDFROM_GEN_A_NUR
From: Julieth Salmon  To: Mat Call MD  Sent: 6/20/2022 3:06 PM CDT  Subject: INR Machine    I just spoke to the company that has been supplying me with my INR Machine and they told me I need to do a finger stick on my machine the have a blood draw on within 15 minutes and then give them the information. So I would think by Wednesday is should be fever free for 24 hours to come in and have it done. I have not had a fever above normal all day today. Thank you  Ren Morocho.
home

## 2023-09-30 ENCOUNTER — INPATIENT (INPATIENT)
Facility: HOSPITAL | Age: 33
LOS: 5 days | Discharge: HOME | DRG: 439 | End: 2023-10-06
Attending: INTERNAL MEDICINE | Admitting: INTERNAL MEDICINE
Payer: MEDICAID

## 2023-09-30 VITALS
DIASTOLIC BLOOD PRESSURE: 87 MMHG | HEIGHT: 62 IN | RESPIRATION RATE: 22 BRPM | WEIGHT: 126.1 LBS | OXYGEN SATURATION: 100 % | TEMPERATURE: 98 F | SYSTOLIC BLOOD PRESSURE: 133 MMHG | HEART RATE: 139 BPM

## 2023-09-30 DIAGNOSIS — Z87.59 PERSONAL HISTORY OF OTHER COMPLICATIONS OF PREGNANCY, CHILDBIRTH AND THE PUERPERIUM: Chronic | ICD-10-CM

## 2023-09-30 LAB
ALBUMIN SERPL ELPH-MCNC: 4.1 G/DL — SIGNIFICANT CHANGE UP (ref 3.5–5)
ALP SERPL-CCNC: 132 U/L — HIGH (ref 40–120)
ALT FLD-CCNC: 115 U/L DA — HIGH (ref 10–60)
ANION GAP SERPL CALC-SCNC: 20 MMOL/L — HIGH (ref 5–17)
APTT BLD: 31.8 SEC — SIGNIFICANT CHANGE UP (ref 24.5–35.6)
AST SERPL-CCNC: 356 U/L — HIGH (ref 10–40)
BASOPHILS # BLD AUTO: 0.03 K/UL — SIGNIFICANT CHANGE UP (ref 0–0.2)
BASOPHILS NFR BLD AUTO: 0.2 % — SIGNIFICANT CHANGE UP (ref 0–2)
BILIRUB SERPL-MCNC: 0.7 MG/DL — SIGNIFICANT CHANGE UP (ref 0.2–1.2)
BUN SERPL-MCNC: 4 MG/DL — LOW (ref 7–18)
CALCIUM SERPL-MCNC: 8.7 MG/DL — SIGNIFICANT CHANGE UP (ref 8.4–10.5)
CHLORIDE SERPL-SCNC: 98 MMOL/L — SIGNIFICANT CHANGE UP (ref 96–108)
CO2 SERPL-SCNC: 9 MMOL/L — CRITICAL LOW (ref 22–31)
CREAT SERPL-MCNC: 1.08 MG/DL — SIGNIFICANT CHANGE UP (ref 0.5–1.3)
EGFR: 69 ML/MIN/1.73M2 — SIGNIFICANT CHANGE UP
EOSINOPHIL # BLD AUTO: 0 K/UL — SIGNIFICANT CHANGE UP (ref 0–0.5)
EOSINOPHIL NFR BLD AUTO: 0 % — SIGNIFICANT CHANGE UP (ref 0–6)
ETHANOL SERPL-MCNC: <3 MG/DL — SIGNIFICANT CHANGE UP (ref 0–10)
GLUCOSE SERPL-MCNC: 133 MG/DL — HIGH (ref 70–99)
HCG SERPL-ACNC: <1 MIU/ML — SIGNIFICANT CHANGE UP
HCT VFR BLD CALC: 41.4 % — SIGNIFICANT CHANGE UP (ref 34.5–45)
HGB BLD-MCNC: 13.5 G/DL — SIGNIFICANT CHANGE UP (ref 11.5–15.5)
IMM GRANULOCYTES NFR BLD AUTO: 0.4 % — SIGNIFICANT CHANGE UP (ref 0–0.9)
INR BLD: 1.05 RATIO — SIGNIFICANT CHANGE UP (ref 0.85–1.18)
LIDOCAIN IGE QN: 218 U/L — HIGH (ref 13–75)
LYMPHOCYTES # BLD AUTO: 0.65 K/UL — LOW (ref 1–3.3)
LYMPHOCYTES # BLD AUTO: 4.7 % — LOW (ref 13–44)
MCHC RBC-ENTMCNC: 32.6 GM/DL — SIGNIFICANT CHANGE UP (ref 32–36)
MCHC RBC-ENTMCNC: 33.2 PG — SIGNIFICANT CHANGE UP (ref 27–34)
MCV RBC AUTO: 101.7 FL — HIGH (ref 80–100)
MONOCYTES # BLD AUTO: 1.06 K/UL — HIGH (ref 0–0.9)
MONOCYTES NFR BLD AUTO: 7.7 % — SIGNIFICANT CHANGE UP (ref 2–14)
NEUTROPHILS # BLD AUTO: 12.04 K/UL — HIGH (ref 1.8–7.4)
NEUTROPHILS NFR BLD AUTO: 87 % — HIGH (ref 43–77)
NRBC # BLD: 0 /100 WBCS — SIGNIFICANT CHANGE UP (ref 0–0)
PLATELET # BLD AUTO: 199 K/UL — SIGNIFICANT CHANGE UP (ref 150–400)
POTASSIUM SERPL-MCNC: SIGNIFICANT CHANGE UP MMOL/L (ref 3.5–5.3)
POTASSIUM SERPL-SCNC: SIGNIFICANT CHANGE UP MMOL/L (ref 3.5–5.3)
PROT SERPL-MCNC: 10.2 G/DL — HIGH (ref 6–8.3)
PROTHROM AB SERPL-ACNC: 11.9 SEC — SIGNIFICANT CHANGE UP (ref 9.5–13)
RBC # BLD: 4.07 M/UL — SIGNIFICANT CHANGE UP (ref 3.8–5.2)
RBC # FLD: 18.3 % — HIGH (ref 10.3–14.5)
SODIUM SERPL-SCNC: 127 MMOL/L — LOW (ref 135–145)
WBC # BLD: 13.84 K/UL — HIGH (ref 3.8–10.5)
WBC # FLD AUTO: 13.84 K/UL — HIGH (ref 3.8–10.5)

## 2023-09-30 PROCEDURE — 99285 EMERGENCY DEPT VISIT HI MDM: CPT

## 2023-09-30 RX ORDER — ONDANSETRON 8 MG/1
4 TABLET, FILM COATED ORAL ONCE
Refills: 0 | Status: COMPLETED | OUTPATIENT
Start: 2023-09-30 | End: 2023-09-30

## 2023-09-30 RX ORDER — SODIUM CHLORIDE 9 MG/ML
1000 INJECTION INTRAMUSCULAR; INTRAVENOUS; SUBCUTANEOUS ONCE
Refills: 0 | Status: COMPLETED | OUTPATIENT
Start: 2023-09-30 | End: 2023-09-30

## 2023-09-30 RX ORDER — MORPHINE SULFATE 50 MG/1
4 CAPSULE, EXTENDED RELEASE ORAL ONCE
Refills: 0 | Status: DISCONTINUED | OUTPATIENT
Start: 2023-09-30 | End: 2023-09-30

## 2023-09-30 RX ORDER — DEXAMETHASONE 0.5 MG/5ML
6 ELIXIR ORAL ONCE
Refills: 0 | Status: COMPLETED | OUTPATIENT
Start: 2023-09-30 | End: 2023-09-30

## 2023-09-30 RX ADMIN — Medication 2 MILLIGRAM(S): at 22:49

## 2023-09-30 RX ADMIN — SODIUM CHLORIDE 1000 MILLILITER(S): 9 INJECTION INTRAMUSCULAR; INTRAVENOUS; SUBCUTANEOUS at 22:49

## 2023-09-30 RX ADMIN — MORPHINE SULFATE 4 MILLIGRAM(S): 50 CAPSULE, EXTENDED RELEASE ORAL at 22:49

## 2023-09-30 RX ADMIN — Medication 6 MILLIGRAM(S): at 22:50

## 2023-09-30 RX ADMIN — ONDANSETRON 4 MILLIGRAM(S): 8 TABLET, FILM COATED ORAL at 22:49
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
## 2023-09-30 NOTE — ED PROVIDER NOTE - NS ED MD DISPO ADMITTING SERVICE
· Echocardiogram May 2021 at [facility]:  EF 55-60%  Normal diastolic function  Consistent with moderate AS, moderate tricuspid regurgitation  Moderately elevated estimated PA systolic pressure    · Repeat echocardiogram done on July 19 shows a mild AS ICU

## 2023-09-30 NOTE — ED PROVIDER NOTE - NSICDXPASTMEDICALHX_GEN_ALL_CORE_FT
PAST MEDICAL HISTORY:  Alcohol abuse, daily use     Ectopic pregnancy 2009    No pertinent past medical history     Pancreatitis 2017 necrotizing pancreatitis

## 2023-09-30 NOTE — ED PROVIDER NOTE - NSICDXPASTSURGICALHX_GEN_ALL_CORE_FT
PAST SURGICAL HISTORY:  No significant past surgical history     S/P ectopic pregnancy Right salpingectomy, 2009
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
## 2023-10-01 DIAGNOSIS — E87.29 OTHER ACIDOSIS: ICD-10-CM

## 2023-10-01 PROBLEM — Z78.9 OTHER SPECIFIED HEALTH STATUS: Chronic | Status: ACTIVE | Noted: 2021-11-01

## 2023-10-01 LAB
ACETONE SERPL-MCNC: ABNORMAL
ALBUMIN SERPL ELPH-MCNC: 3 G/DL — LOW (ref 3.5–5)
ALBUMIN SERPL ELPH-MCNC: 3.7 G/DL — SIGNIFICANT CHANGE UP (ref 3.5–5)
ALP SERPL-CCNC: 119 U/L — SIGNIFICANT CHANGE UP (ref 40–120)
ALP SERPL-CCNC: 98 U/L — SIGNIFICANT CHANGE UP (ref 40–120)
ALT FLD-CCNC: 74 U/L DA — HIGH (ref 10–60)
ALT FLD-CCNC: 93 U/L DA — HIGH (ref 10–60)
AMPHET UR-MCNC: NEGATIVE — SIGNIFICANT CHANGE UP
ANION GAP SERPL CALC-SCNC: 17 MMOL/L — SIGNIFICANT CHANGE UP (ref 5–17)
ANION GAP SERPL CALC-SCNC: 20 MMOL/L — HIGH (ref 5–17)
ANION GAP SERPL CALC-SCNC: 23 MMOL/L — HIGH (ref 5–17)
ANION GAP SERPL CALC-SCNC: 9 MMOL/L — SIGNIFICANT CHANGE UP (ref 5–17)
ANION GAP SERPL CALC-SCNC: 9 MMOL/L — SIGNIFICANT CHANGE UP (ref 5–17)
APPEARANCE UR: CLEAR — SIGNIFICANT CHANGE UP
AST SERPL-CCNC: 165 U/L — HIGH (ref 10–40)
AST SERPL-CCNC: 223 U/L — HIGH (ref 10–40)
BACTERIA # UR AUTO: ABNORMAL /HPF
BARBITURATES UR SCN-MCNC: NEGATIVE — SIGNIFICANT CHANGE UP
BASE EXCESS BLDA CALC-SCNC: -2.1 MMOL/L — LOW (ref -2–3)
BENZODIAZ UR-MCNC: NEGATIVE — SIGNIFICANT CHANGE UP
BILIRUB SERPL-MCNC: 0.9 MG/DL — SIGNIFICANT CHANGE UP (ref 0.2–1.2)
BILIRUB SERPL-MCNC: 0.9 MG/DL — SIGNIFICANT CHANGE UP (ref 0.2–1.2)
BILIRUB UR-MCNC: NEGATIVE — SIGNIFICANT CHANGE UP
BLOOD GAS COMMENTS ARTERIAL: SIGNIFICANT CHANGE UP
BUN SERPL-MCNC: 4 MG/DL — LOW (ref 7–18)
BUN SERPL-MCNC: 5 MG/DL — LOW (ref 7–18)
BUN SERPL-MCNC: 5 MG/DL — LOW (ref 7–18)
BUN SERPL-MCNC: 7 MG/DL — SIGNIFICANT CHANGE UP (ref 7–18)
BUN SERPL-MCNC: 7 MG/DL — SIGNIFICANT CHANGE UP (ref 7–18)
CALCIUM SERPL-MCNC: 7.8 MG/DL — LOW (ref 8.4–10.5)
CALCIUM SERPL-MCNC: 8.3 MG/DL — LOW (ref 8.4–10.5)
CALCIUM SERPL-MCNC: 8.3 MG/DL — LOW (ref 8.4–10.5)
CALCIUM SERPL-MCNC: 8.4 MG/DL — SIGNIFICANT CHANGE UP (ref 8.4–10.5)
CALCIUM SERPL-MCNC: 8.5 MG/DL — SIGNIFICANT CHANGE UP (ref 8.4–10.5)
CHLORIDE SERPL-SCNC: 105 MMOL/L — SIGNIFICANT CHANGE UP (ref 96–108)
CHLORIDE SERPL-SCNC: 106 MMOL/L — SIGNIFICANT CHANGE UP (ref 96–108)
CHLORIDE SERPL-SCNC: 107 MMOL/L — SIGNIFICANT CHANGE UP (ref 96–108)
CHLORIDE SERPL-SCNC: 108 MMOL/L — SIGNIFICANT CHANGE UP (ref 96–108)
CHLORIDE SERPL-SCNC: 99 MMOL/L — SIGNIFICANT CHANGE UP (ref 96–108)
CO2 SERPL-SCNC: 12 MMOL/L — LOW (ref 22–31)
CO2 SERPL-SCNC: 22 MMOL/L — SIGNIFICANT CHANGE UP (ref 22–31)
CO2 SERPL-SCNC: 24 MMOL/L — SIGNIFICANT CHANGE UP (ref 22–31)
CO2 SERPL-SCNC: 8 MMOL/L — CRITICAL LOW (ref 22–31)
CO2 SERPL-SCNC: 8 MMOL/L — CRITICAL LOW (ref 22–31)
COCAINE METAB.OTHER UR-MCNC: POSITIVE
COLOR SPEC: YELLOW — SIGNIFICANT CHANGE UP
COMMENT - URINE: SIGNIFICANT CHANGE UP
CREAT SERPL-MCNC: 0.85 MG/DL — SIGNIFICANT CHANGE UP (ref 0.5–1.3)
CREAT SERPL-MCNC: 0.87 MG/DL — SIGNIFICANT CHANGE UP (ref 0.5–1.3)
CREAT SERPL-MCNC: 1.16 MG/DL — SIGNIFICANT CHANGE UP (ref 0.5–1.3)
CREAT SERPL-MCNC: 1.34 MG/DL — HIGH (ref 0.5–1.3)
CREAT SERPL-MCNC: 1.61 MG/DL — HIGH (ref 0.5–1.3)
DIFF PNL FLD: ABNORMAL
EGFR: 43 ML/MIN/1.73M2 — LOW
EGFR: 53 ML/MIN/1.73M2 — LOW
EGFR: 63 ML/MIN/1.73M2 — SIGNIFICANT CHANGE UP
EGFR: 90 ML/MIN/1.73M2 — SIGNIFICANT CHANGE UP
EGFR: 92 ML/MIN/1.73M2 — SIGNIFICANT CHANGE UP
EPI CELLS # UR: PRESENT
GAS PNL BLDA: SIGNIFICANT CHANGE UP
GLUCOSE SERPL-MCNC: 110 MG/DL — HIGH (ref 70–99)
GLUCOSE SERPL-MCNC: 128 MG/DL — HIGH (ref 70–99)
GLUCOSE SERPL-MCNC: 134 MG/DL — HIGH (ref 70–99)
GLUCOSE SERPL-MCNC: 143 MG/DL — HIGH (ref 70–99)
GLUCOSE SERPL-MCNC: 179 MG/DL — HIGH (ref 70–99)
GLUCOSE UR QL: 250 MG/DL
GRAN CASTS # UR COMP ASSIST: PRESENT
HCO3 BLDA-SCNC: 21 MMOL/L — SIGNIFICANT CHANGE UP (ref 21–28)
HOROWITZ INDEX BLDA+IHG-RTO: 21 — SIGNIFICANT CHANGE UP
HYALINE CASTS # UR AUTO: PRESENT
KETONES UR-MCNC: >=160 MG/DL
LEUKOCYTE ESTERASE UR-ACNC: NEGATIVE — SIGNIFICANT CHANGE UP
MAGNESIUM SERPL-MCNC: 1.4 MG/DL — LOW (ref 1.6–2.6)
MAGNESIUM SERPL-MCNC: 1.6 MG/DL — SIGNIFICANT CHANGE UP (ref 1.6–2.6)
MAGNESIUM SERPL-MCNC: 2.1 MG/DL — SIGNIFICANT CHANGE UP (ref 1.6–2.6)
METHADONE UR-MCNC: NEGATIVE — SIGNIFICANT CHANGE UP
MRSA PCR RESULT.: SIGNIFICANT CHANGE UP
NITRITE UR-MCNC: NEGATIVE — SIGNIFICANT CHANGE UP
OPIATES UR-MCNC: POSITIVE
OSMOLALITY SERPL: 290 MOSMOL/KG — SIGNIFICANT CHANGE UP (ref 275–300)
PCO2 BLDA: 29 MMHG — LOW (ref 32–35)
PCP SPEC-MCNC: SIGNIFICANT CHANGE UP
PCP UR-MCNC: NEGATIVE — SIGNIFICANT CHANGE UP
PH BLDA: 7.46 — HIGH (ref 7.35–7.45)
PH UR: 6 — SIGNIFICANT CHANGE UP (ref 5–8)
PHOSPHATE SERPL-MCNC: 0.9 MG/DL — CRITICAL LOW (ref 2.5–4.5)
PHOSPHATE SERPL-MCNC: 1.4 MG/DL — LOW (ref 2.5–4.5)
PHOSPHATE SERPL-MCNC: 1.9 MG/DL — LOW (ref 2.5–4.5)
PO2 BLDA: 125 MMHG — HIGH (ref 83–108)
POTASSIUM SERPL-MCNC: 3 MMOL/L — LOW (ref 3.5–5.3)
POTASSIUM SERPL-MCNC: 3.1 MMOL/L — LOW (ref 3.5–5.3)
POTASSIUM SERPL-MCNC: 3.9 MMOL/L — SIGNIFICANT CHANGE UP (ref 3.5–5.3)
POTASSIUM SERPL-MCNC: 5.5 MMOL/L — HIGH (ref 3.5–5.3)
POTASSIUM SERPL-MCNC: 7.3 MMOL/L — CRITICAL HIGH (ref 3.5–5.3)
POTASSIUM SERPL-SCNC: 3 MMOL/L — LOW (ref 3.5–5.3)
POTASSIUM SERPL-SCNC: 3.1 MMOL/L — LOW (ref 3.5–5.3)
POTASSIUM SERPL-SCNC: 3.9 MMOL/L — SIGNIFICANT CHANGE UP (ref 3.5–5.3)
POTASSIUM SERPL-SCNC: 5.5 MMOL/L — HIGH (ref 3.5–5.3)
POTASSIUM SERPL-SCNC: 7.3 MMOL/L — CRITICAL HIGH (ref 3.5–5.3)
PROT SERPL-MCNC: 7.7 G/DL — SIGNIFICANT CHANGE UP (ref 6–8.3)
PROT SERPL-MCNC: 9 G/DL — HIGH (ref 6–8.3)
PROT UR-MCNC: 300 MG/DL
RBC CASTS # UR COMP ASSIST: 1 /HPF — SIGNIFICANT CHANGE UP (ref 0–4)
S AUREUS DNA NOSE QL NAA+PROBE: SIGNIFICANT CHANGE UP
SAO2 % BLDA: 99 % — SIGNIFICANT CHANGE UP
SODIUM SERPL-SCNC: 130 MMOL/L — LOW (ref 135–145)
SODIUM SERPL-SCNC: 133 MMOL/L — LOW (ref 135–145)
SODIUM SERPL-SCNC: 136 MMOL/L — SIGNIFICANT CHANGE UP (ref 135–145)
SODIUM SERPL-SCNC: 139 MMOL/L — SIGNIFICANT CHANGE UP (ref 135–145)
SODIUM SERPL-SCNC: 139 MMOL/L — SIGNIFICANT CHANGE UP (ref 135–145)
SP GR SPEC: 1.02 — SIGNIFICANT CHANGE UP (ref 1–1.03)
THC UR QL: NEGATIVE — SIGNIFICANT CHANGE UP
UROBILINOGEN FLD QL: 1 MG/DL — SIGNIFICANT CHANGE UP (ref 0.2–1)
WBC UR QL: 2 /HPF — SIGNIFICANT CHANGE UP (ref 0–5)

## 2023-10-01 PROCEDURE — 99222 1ST HOSP IP/OBS MODERATE 55: CPT

## 2023-10-01 PROCEDURE — G1004: CPT

## 2023-10-01 PROCEDURE — 72131 CT LUMBAR SPINE W/O DYE: CPT | Mod: 26,MG

## 2023-10-01 PROCEDURE — 71250 CT THORAX DX C-: CPT | Mod: 26,MA

## 2023-10-01 PROCEDURE — 74176 CT ABD & PELVIS W/O CONTRAST: CPT | Mod: 26,MA

## 2023-10-01 RX ORDER — MAGNESIUM SULFATE 500 MG/ML
2 VIAL (ML) INJECTION ONCE
Refills: 0 | Status: COMPLETED | OUTPATIENT
Start: 2023-10-01 | End: 2023-10-01

## 2023-10-01 RX ORDER — DEXTROSE 50 % IN WATER 50 %
50 SYRINGE (ML) INTRAVENOUS ONCE
Refills: 0 | Status: COMPLETED | OUTPATIENT
Start: 2023-10-01 | End: 2023-10-01

## 2023-10-01 RX ORDER — THIAMINE MONONITRATE (VIT B1) 100 MG
100 TABLET ORAL DAILY
Refills: 0 | Status: DISCONTINUED | OUTPATIENT
Start: 2023-10-01 | End: 2023-10-01

## 2023-10-01 RX ORDER — SODIUM BICARBONATE 1 MEQ/ML
50 SYRINGE (ML) INTRAVENOUS ONCE
Refills: 0 | Status: COMPLETED | OUTPATIENT
Start: 2023-10-01 | End: 2023-10-01

## 2023-10-01 RX ORDER — ACETAMINOPHEN 500 MG
650 TABLET ORAL EVERY 6 HOURS
Refills: 0 | Status: DISCONTINUED | OUTPATIENT
Start: 2023-10-01 | End: 2023-10-06

## 2023-10-01 RX ORDER — SODIUM CHLORIDE 9 MG/ML
1000 INJECTION INTRAMUSCULAR; INTRAVENOUS; SUBCUTANEOUS ONCE
Refills: 0 | Status: COMPLETED | OUTPATIENT
Start: 2023-10-01 | End: 2023-10-01

## 2023-10-01 RX ORDER — ONDANSETRON 8 MG/1
4 TABLET, FILM COATED ORAL EVERY 6 HOURS
Refills: 0 | Status: DISCONTINUED | OUTPATIENT
Start: 2023-10-01 | End: 2023-10-06

## 2023-10-01 RX ORDER — SODIUM BICARBONATE 1 MEQ/ML
0.06 SYRINGE (ML) INTRAVENOUS
Qty: 150 | Refills: 0 | Status: DISCONTINUED | OUTPATIENT
Start: 2023-10-01 | End: 2023-10-01

## 2023-10-01 RX ORDER — KETOROLAC TROMETHAMINE 30 MG/ML
15 SYRINGE (ML) INJECTION ONCE
Refills: 0 | Status: DISCONTINUED | OUTPATIENT
Start: 2023-10-01 | End: 2023-10-01

## 2023-10-01 RX ORDER — SODIUM CHLORIDE 9 MG/ML
1000 INJECTION, SOLUTION INTRAVENOUS
Refills: 0 | Status: DISCONTINUED | OUTPATIENT
Start: 2023-10-01 | End: 2023-10-01

## 2023-10-01 RX ORDER — INSULIN HUMAN 100 [IU]/ML
5 INJECTION, SOLUTION SUBCUTANEOUS ONCE
Refills: 0 | Status: COMPLETED | OUTPATIENT
Start: 2023-10-01 | End: 2023-10-01

## 2023-10-01 RX ORDER — POTASSIUM CHLORIDE 20 MEQ
40 PACKET (EA) ORAL ONCE
Refills: 0 | Status: COMPLETED | OUTPATIENT
Start: 2023-10-01 | End: 2023-10-01

## 2023-10-01 RX ORDER — FOLIC ACID 0.8 MG
1 TABLET ORAL DAILY
Refills: 0 | Status: DISCONTINUED | OUTPATIENT
Start: 2023-10-01 | End: 2023-10-01

## 2023-10-01 RX ORDER — SODIUM CHLORIDE 9 MG/ML
1000 INJECTION, SOLUTION INTRAVENOUS
Refills: 0 | Status: DISCONTINUED | OUTPATIENT
Start: 2023-10-01 | End: 2023-10-02

## 2023-10-01 RX ORDER — ALBUTEROL 90 UG/1
2.5 AEROSOL, METERED ORAL
Refills: 0 | Status: DISCONTINUED | OUTPATIENT
Start: 2023-10-01 | End: 2023-10-06

## 2023-10-01 RX ORDER — FOLIC ACID 0.8 MG
1 TABLET ORAL DAILY
Refills: 0 | Status: DISCONTINUED | OUTPATIENT
Start: 2023-10-01 | End: 2023-10-06

## 2023-10-01 RX ORDER — DEXTROSE 50 % IN WATER 50 %
25 SYRINGE (ML) INTRAVENOUS ONCE
Refills: 0 | Status: COMPLETED | OUTPATIENT
Start: 2023-10-01 | End: 2023-10-01

## 2023-10-01 RX ORDER — MORPHINE SULFATE 50 MG/1
2 CAPSULE, EXTENDED RELEASE ORAL EVERY 4 HOURS
Refills: 0 | Status: DISCONTINUED | OUTPATIENT
Start: 2023-10-01 | End: 2023-10-01

## 2023-10-01 RX ORDER — SODIUM ZIRCONIUM CYCLOSILICATE 10 G/10G
5 POWDER, FOR SUSPENSION ORAL ONCE
Refills: 0 | Status: COMPLETED | OUTPATIENT
Start: 2023-10-01 | End: 2023-10-01

## 2023-10-01 RX ORDER — ALBUTEROL 90 UG/1
10 AEROSOL, METERED ORAL ONCE
Refills: 0 | Status: DISCONTINUED | OUTPATIENT
Start: 2023-10-01 | End: 2023-10-01

## 2023-10-01 RX ORDER — CALCIUM GLUCONATE 100 MG/ML
2 VIAL (ML) INTRAVENOUS ONCE
Refills: 0 | Status: COMPLETED | OUTPATIENT
Start: 2023-10-01 | End: 2023-10-01

## 2023-10-01 RX ORDER — INSULIN HUMAN 100 [IU]/ML
10 INJECTION, SOLUTION SUBCUTANEOUS ONCE
Refills: 0 | Status: COMPLETED | OUTPATIENT
Start: 2023-10-01 | End: 2023-10-01

## 2023-10-01 RX ORDER — SODIUM ZIRCONIUM CYCLOSILICATE 10 G/10G
10 POWDER, FOR SUSPENSION ORAL ONCE
Refills: 0 | Status: COMPLETED | OUTPATIENT
Start: 2023-10-01 | End: 2023-10-01

## 2023-10-01 RX ORDER — HYDROMORPHONE HYDROCHLORIDE 2 MG/ML
0.5 INJECTION INTRAMUSCULAR; INTRAVENOUS; SUBCUTANEOUS ONCE
Refills: 0 | Status: DISCONTINUED | OUTPATIENT
Start: 2023-10-01 | End: 2023-10-01

## 2023-10-01 RX ORDER — DEXTROSE 50 % IN WATER 50 %
50 SYRINGE (ML) INTRAVENOUS ONCE
Refills: 0 | Status: DISCONTINUED | OUTPATIENT
Start: 2023-10-01 | End: 2023-10-01

## 2023-10-01 RX ORDER — INFLUENZA VIRUS VACCINE 15; 15; 15; 15 UG/.5ML; UG/.5ML; UG/.5ML; UG/.5ML
0.5 SUSPENSION INTRAMUSCULAR ONCE
Refills: 0 | Status: DISCONTINUED | OUTPATIENT
Start: 2023-10-01 | End: 2023-10-01

## 2023-10-01 RX ORDER — THIAMINE MONONITRATE (VIT B1) 100 MG
500 TABLET ORAL EVERY 8 HOURS
Refills: 0 | Status: COMPLETED | OUTPATIENT
Start: 2023-10-01 | End: 2023-10-04

## 2023-10-01 RX ORDER — POTASSIUM CHLORIDE 20 MEQ
10 PACKET (EA) ORAL
Refills: 0 | Status: COMPLETED | OUTPATIENT
Start: 2023-10-01 | End: 2023-10-01

## 2023-10-01 RX ORDER — SODIUM,POTASSIUM PHOSPHATES 278-250MG
1 POWDER IN PACKET (EA) ORAL ONCE
Refills: 0 | Status: COMPLETED | OUTPATIENT
Start: 2023-10-01 | End: 2023-10-01

## 2023-10-01 RX ORDER — INSULIN HUMAN 100 [IU]/ML
5 INJECTION, SOLUTION SUBCUTANEOUS ONCE
Refills: 0 | Status: DISCONTINUED | OUTPATIENT
Start: 2023-10-01 | End: 2023-10-01

## 2023-10-01 RX ADMIN — Medication 40 MILLIEQUIVALENT(S): at 21:05

## 2023-10-01 RX ADMIN — Medication 50 MEQ/KG/HR: at 09:42

## 2023-10-01 RX ADMIN — SODIUM CHLORIDE 1000 MILLILITER(S): 9 INJECTION INTRAMUSCULAR; INTRAVENOUS; SUBCUTANEOUS at 02:26

## 2023-10-01 RX ADMIN — Medication 25 GRAM(S): at 21:05

## 2023-10-01 RX ADMIN — MORPHINE SULFATE 4 MILLIGRAM(S): 50 CAPSULE, EXTENDED RELEASE ORAL at 00:25

## 2023-10-01 RX ADMIN — HYDROMORPHONE HYDROCHLORIDE 0.5 MILLIGRAM(S): 2 INJECTION INTRAMUSCULAR; INTRAVENOUS; SUBCUTANEOUS at 22:30

## 2023-10-01 RX ADMIN — Medication 1 MILLIGRAM(S): at 11:11

## 2023-10-01 RX ADMIN — Medication 105 MILLIGRAM(S): at 14:00

## 2023-10-01 RX ADMIN — Medication 100 MILLIEQUIVALENT(S): at 23:43

## 2023-10-01 RX ADMIN — Medication 15 MILLIGRAM(S): at 11:10

## 2023-10-01 RX ADMIN — Medication 1 TABLET(S): at 11:11

## 2023-10-01 RX ADMIN — Medication 100 MILLIEQUIVALENT(S): at 22:19

## 2023-10-01 RX ADMIN — Medication 1 PACKET(S): at 21:42

## 2023-10-01 RX ADMIN — SODIUM CHLORIDE 150 MILLILITER(S): 9 INJECTION, SOLUTION INTRAVENOUS at 07:28

## 2023-10-01 RX ADMIN — Medication 100 MILLIEQUIVALENT(S): at 21:05

## 2023-10-01 RX ADMIN — Medication 15 MILLIGRAM(S): at 13:46

## 2023-10-01 RX ADMIN — INSULIN HUMAN 10 UNIT(S): 100 INJECTION, SOLUTION SUBCUTANEOUS at 05:42

## 2023-10-01 RX ADMIN — SODIUM CHLORIDE 75 MILLILITER(S): 9 INJECTION, SOLUTION INTRAVENOUS at 14:28

## 2023-10-01 RX ADMIN — SODIUM CHLORIDE 1000 MILLILITER(S): 9 INJECTION INTRAMUSCULAR; INTRAVENOUS; SUBCUTANEOUS at 05:43

## 2023-10-01 RX ADMIN — SODIUM CHLORIDE 1000 MILLILITER(S): 9 INJECTION INTRAMUSCULAR; INTRAVENOUS; SUBCUTANEOUS at 04:00

## 2023-10-01 RX ADMIN — MORPHINE SULFATE 2 MILLIGRAM(S): 50 CAPSULE, EXTENDED RELEASE ORAL at 07:28

## 2023-10-01 RX ADMIN — HYDROMORPHONE HYDROCHLORIDE 0.5 MILLIGRAM(S): 2 INJECTION INTRAMUSCULAR; INTRAVENOUS; SUBCUTANEOUS at 22:04

## 2023-10-01 RX ADMIN — Medication 50 MILLIEQUIVALENT(S): at 08:56

## 2023-10-01 RX ADMIN — MORPHINE SULFATE 2 MILLIGRAM(S): 50 CAPSULE, EXTENDED RELEASE ORAL at 08:50

## 2023-10-01 RX ADMIN — Medication 50 MILLIEQUIVALENT(S): at 05:43

## 2023-10-01 RX ADMIN — INSULIN HUMAN 5 UNIT(S): 100 INJECTION, SOLUTION SUBCUTANEOUS at 04:53

## 2023-10-01 RX ADMIN — SODIUM ZIRCONIUM CYCLOSILICATE 10 GRAM(S): 10 POWDER, FOR SUSPENSION ORAL at 05:43

## 2023-10-01 RX ADMIN — SODIUM CHLORIDE 1000 MILLILITER(S): 9 INJECTION INTRAMUSCULAR; INTRAVENOUS; SUBCUTANEOUS at 08:51

## 2023-10-01 RX ADMIN — Medication 85 MILLIMOLE(S): at 10:04

## 2023-10-01 RX ADMIN — Medication 100 GRAM(S): at 04:35

## 2023-10-01 RX ADMIN — ALBUTEROL 2.5 MILLIGRAM(S): 90 AEROSOL, METERED ORAL at 08:51

## 2023-10-01 RX ADMIN — Medication 50 MILLILITER(S): at 05:43

## 2023-10-01 RX ADMIN — Medication 2 MILLIGRAM(S): at 07:28

## 2023-10-01 RX ADMIN — SODIUM ZIRCONIUM CYCLOSILICATE 5 GRAM(S): 10 POWDER, FOR SUSPENSION ORAL at 04:34

## 2023-10-01 RX ADMIN — Medication 25 GRAM(S): at 04:53

## 2023-10-01 RX ADMIN — ALBUTEROL 2.5 MILLIGRAM(S): 90 AEROSOL, METERED ORAL at 05:43

## 2023-10-01 RX ADMIN — Medication 105 MILLIGRAM(S): at 21:05

## 2023-10-01 RX ADMIN — Medication 2 MILLIGRAM(S): at 11:11

## 2023-10-01 NOTE — ED ADULT NURSE NOTE - NSFALLUNIVINTERV_ED_ALL_ED
Bed/Stretcher in lowest position, wheels locked, appropriate side rails in place/Call bell, personal items and telephone in reach/Instruct patient to call for assistance before getting out of bed/chair/stretcher/Non-slip footwear applied when patient is off stretcher/[place] to call system/Physically safe environment - no spills, clutter or unnecessary equipment/Purposeful proactive rounding/Room/bathroom lighting operational, light cord in reach

## 2023-10-01 NOTE — PROGRESS NOTE ADULT - ASSESSMENT
33 year old female from home with medical history of alcohol use disorder and necrotizing pancreatitis in 2017 presenting to ED for abdominal pain admitted for acute pancreatitis and hyperkalemia       _________CNS___________  #Pain  tylenol PRN for mild pain  Morphine for severe pain     #Etoh withdrawal  CIWA protocol   ativan taper  ativan PRN         _________CVS___________  -Sinus tachycardia  -likely in the setting of dehydration and cocaine use       _________RESP__________    no active issues    ___________GI____________  #Acute pancreatitis   -s/p 4L NS in ED   -c/w IVF D5NS 150cc/hr  -tylenol PRN for mild pain and morphine for severe pain  -advance diet as tolerated  -zofran PRN for n/v       ________ RENAL__________  #Metabolic Acidosis  -in the setting ot ETOH and possibly starvation ketoacidosis  -Hco3 8: s/p 1 amp of bicarb and 4L NS IVF  -c/w IVF D5NS maintenance fluids   -BMP q2h     #Hyperkalemia  K 7.3  s/p 5U insulin x2, 2g calcium gluconate, 15 g lokelma  s/p albuterol 10mg nebulizer treatment  no EKG changes  f/u repeat BMP stat     #hepatic steatosis  likely in the setting of ETOH use    #Positive Urine tox  -cocaine positive in urine toxicology. avoid beta blockers     _____________GU_____________  CT abdomen showing The uterine fundus appears enlarged; underlying fibroids or adenomyosis   is not excluded.  Suspected 1.5 cm dominant follicle in the right ovary.    -OBGYN followup outpatient         __________MSK___________  no active issues     ___________ID____________  no active issues     _________ENDO__________  no active issues     ______HEME/ONC_______  Leukocytosis  likely reactive. No signs of infection     _________SKIN____________  no active issues     ________PROPHY_______  #DVT  #GI     ______GOC/DISPO___________  ICU    33 year old female from home with medical history of alcohol use disorder and necrotizing pancreatitis in 2017 presenting to ED for abdominal pain admitted for acute pancreatitis and hyperkalemia       _________CNS___________  #Pain  tylenol PRN for mild pain  Tramadol     #Etoh withdrawal  CIWA protocol   ativan PRN   folic acid and thiamine       _________CVS___________  -Sinus tachycardia  -likely in the setting of dehydration and cocaine use   - avoid beta blockers     _________RESP__________    no active issues    ___________GI____________  #Acute pancreatitis   -s/p 4L NS in ED   -tylenol PRN for mild pain and morphine for severe pain  -advance diet as tolerated  -zofran PRN for n/v       ________ RENAL__________  #Metabolic Acidosis  -in the setting ot ETOH and possibly starvation ketoacidosis  -Hco3 8: s/p 1 amp of bicarb and 4L NS IVF  - will start bicarbonate drip  - f/u repeat labs     #Hyperkalemia  (resolved)   K 7.3, s/p hyperkalemic cocktail  - repeat K now 3.9    #hepatic steatosis  likely in the setting of ETOH use    #Positive Urine tox  -cocaine positive in urine toxicology. avoid beta blockers     _____________GU_____________  CT abdomen showing The uterine fundus appears enlarged; underlying fibroids or adenomyosis   is not excluded.  Suspected 1.5 cm dominant follicle in the right ovary.    -OBGYN followup outpatient         __________MSK___________  no active issues     ___________ID____________  no active issues     _________ENDO__________  no active issues     ______HEME/ONC_______  Leukocytosis  likely reactive. No signs of infection     _________SKIN____________  no active issues     ________PROPHY_______  #DVT  #GI     ______GOC/DISPO___________  ICU    33 year old female from home with medical history of alcohol use disorder and necrotizing pancreatitis in 2017 presenting to ED for abdominal pain admitted for acute pancreatitis and hyperkalemia.     #Metabolic acidosis   #Acute alcohol withdrawal  #Hyperkalemia  #Acute pancreatitis     _________CNS___________  #Pain  tylenol PRN for mild pain  Tramadol     #Etoh withdrawal  CIWA protocol   ativan PRN   folic acid and thiamine       _________CVS___________  #Sinus tachycardia  -likely in the setting of dehydration, compensatory for metabolic acidosis   - avoid beta blockers     _________RESP__________    no active issues    ___________GI____________  #Acute pancreatitis   -s/p 4L NS in ED   -tylenol PRN for mild pain and morphine for severe pain  -advance diet as tolerated  -zofran PRN for n/v       ________ RENAL__________  #Metabolic Acidosis  -in the setting ot ETOH and possibly starvation ketoacidosis  -Hco3 8: s/p 1 amp of bicarb and 4L NS IVF  - s/p bicarbonate drip > HCO3 21    #Hyperkalemia  (resolved)   K 7.3, s/p hyperkalemic cocktail  - 3.9 > 3.1  ? drop in the setting of pH increase     #hepatic steatosis  likely in the setting of ETOH use    #Positive Urine tox  -cocaine positive in urine toxicology. avoid beta blockers     _____________GU_____________  CT abdomen showing The uterine fundus appears enlarged; underlying fibroids or adenomyosis   is not excluded.  Suspected 1.5 cm dominant follicle in the right ovary.    -OBGYN followup outpatient       __________MSK___________  no active issues     ___________ID____________  no active issues     _________ENDO__________  no active issues     ______HEME/ONC_______  Leukocytosis  likely reactive. No signs of infection     _________SKIN____________  no active issues     ________PROPHY_______  #DVT  #GI     ______GOC/DISPO___________  ICU

## 2023-10-01 NOTE — PROGRESS NOTE ADULT - ATTENDING COMMENTS
33F with H/o ETOH abuse, pancreatitis  presented to the ED with abdominal/back pain and CT abdomen done showed acute pancreatitis. Her lab work showed lipase 218 with severe metabolic acidosis with HCO3 of 8 and severe hyperkalemia K 7.3, She was started on IV fluid boluses with LR and given cocktail for correction of hyperkalemia in addition to bicarbonate pushes in the ED., Accepted to ICU for further management and closer monitoring.     Assessment  Acute pancreatitis from alcohol use  Severe high anion gap metabolic acidosis likely from combination of alcoholic and starvation ketoacidosis.  Severe hyperkalemia  Polysubstance abuse - cocaine +  DANUTA, likely prerenal  Transaminitis likely from alcoholic hepatitis  Hypovolemic hyponatremia   Leukocytosis likely reactive  H/o ETOH abuse  H/o Pancreatitis    Plan  Sodium bicarb supplementation  Cont. IV fluid hydration   Potassium improving   Monitor renal function, creatinine is downtrending consistent with prerenal etiology  Lactate is normal  Osmolal gap normal  Pain management with IV morphine as needed for acute pancreatitis.  Cont. thiamine, folate and multivitamin.   Monitor for alcohol withdrawal, CIWA protoco for CIWA > 8  Hepatitis profile  DVT prophylaxis with Lovenox  Cont. ICU care for today

## 2023-10-01 NOTE — PROGRESS NOTE ADULT - SUBJECTIVE AND OBJECTIVE BOX
Patient is a 33y old  Female who presents with a chief complaint of acute pancreatitis (01 Oct 2023 05:51)      OVERNIGHT EVENTS:   No overnight events   Afebrile, hemodynamically stable     SUBJECTIVE/INTERVAL HPI: Patient seen and examined at bedside. Denies headache, vision changes, chest pain, shortness of breath, abdominal pain, nausea, vomiting, diarrhea, constipation, dysuria, swelling, and rash.      PRESSORS: [ ] YES [ ] NO  WHICH:    ICU Vital Signs Last 24 Hrs  T(C): 37.4 (01 Oct 2023 06:30), Max: 37.4 (01 Oct 2023 06:30)  T(F): 99.3 (01 Oct 2023 06:30), Max: 99.3 (01 Oct 2023 06:30)  HR: 132 (01 Oct 2023 07:00) (122 - 139)  BP: 115/79 (01 Oct 2023 07:00) (113/66 - 147/91)  BP(mean): 91 (01 Oct 2023 07:00) (91 - 103)  ABP: --  ABP(mean): --  RR: 21 (01 Oct 2023 07:00) (18 - 24)  SpO2: 100% (01 Oct 2023 07:00) (99% - 100%)    O2 Parameters below as of 01 Oct 2023 07:00  Patient On (Oxygen Delivery Method): room air          I&O's Summary    30 Sep 2023 07:01  -  01 Oct 2023 07:00  --------------------------------------------------------  IN: 0 mL / OUT: 400 mL / NET: -400 mL          PHYSICAL EXAM:  GENERAL: No acute distress   HEAD:  Atraumatic, Normocephalic  EYES: EOMI, PERRLA, conjunctiva and sclera clear  ENMT: No tonsillar erythema, exudates, or enlargement; Moist mucous membranes  NECK: Supple, No JVD, Normal thyroid  HEART: Regular rate and rhythm; No murmurs, rubs, or gallops  RESPIRATORY: CTA B/L, No W/R/R  ABDOMEN: Soft, Nontender, Nondistended; Bowel sounds present  NEUROLOGY: A&Ox3, nonfocal, moving all extremities  EXTREMITIES:  2+ Peripheral Pulses, No clubbing, cyanosis, or edema  SKIN: warm, dry, normal color, no rash or abnormal lesions    LABS:                        13.5   13.84 )-----------( 199      ( 30 Sep 2023 22:20 )             41.4     10-01    x   |  x   |  7   ----------------------------<  128<H>  x    |  8<LL>  |  1.34<H>    Ca    8.5      01 Oct 2023 05:46  Mg     2.1     10-01    TPro  9.0<H>  /  Alb  3.7  /  TBili  0.9  /  DBili  x   /  AST  x   /  ALT  93<H>  /  AlkPhos  119  10-01    PT/INR - ( 30 Sep 2023 22:20 )   PT: 11.9 sec;   INR: 1.05 ratio         PTT - ( 30 Sep 2023 22:20 )  PTT:31.8 sec  Urinalysis Basic - ( 01 Oct 2023 05:46 )    Color: x / Appearance: x / SG: x / pH: x  Gluc: 128 mg/dL / Ketone: x  / Bili: x / Urobili: x   Blood: x / Protein: x / Nitrite: x   Leuk Esterase: x / RBC: x / WBC x   Sq Epi: x / Non Sq Epi: x / Bacteria: x      CAPILLARY BLOOD GLUCOSE      POCT Blood Glucose.: 133 mg/dL (01 Oct 2023 05:39)  POCT Blood Glucose.: 127 mg/dL (01 Oct 2023 04:37)    ABG - ( 01 Oct 2023 07:03 )  pH, Arterial: 7.30  pH, Blood: x     /  pCO2: 21    /  pO2: 124   / HCO3: 10    / Base Excess: -13.9 /  SaO2: 100                 Consultant(s) Notes Reviewed:  [x ] YES  [ ] NO    MEDICATIONS  (STANDING):  albuterol    0.083%.. 2.5 milliGRAM(s) Nebulizer every 20 minutes  dextrose 5% + sodium chloride 0.9%. 1000 milliLiter(s) (150 mL/Hr) IV Continuous <Continuous>  influenza   Vaccine 0.5 milliLiter(s) IntraMuscular once  LORazepam   Injectable 2 milliGRAM(s) IV Push every 6 hours  LORazepam   Injectable   IV Push   sodium chloride 0.9% Bolus 1000 milliLiter(s) IV Bolus once  sodium chloride 0.9% Bolus 1000 milliLiter(s) IV Bolus once    MEDICATIONS  (PRN):  acetaminophen     Tablet .. 650 milliGRAM(s) Oral every 6 hours PRN Temp greater or equal to 38C (100.4F), Mild Pain (1 - 3)  LORazepam   Injectable 2 milliGRAM(s) IV Push every 1 hour PRN Agitation  morphine  - Injectable 2 milliGRAM(s) IV Push every 4 hours PRN Severe Pain (7 - 10)  ondansetron Injectable 4 milliGRAM(s) IV Push every 6 hours PRN Nausea and/or Vomiting      Care Discussed with Consultants/Other Providers [ x] YES  [ ] NO    RADIOLOGY & ADDITIONAL TESTS: Patient is a 33y old  Female who presents with a chief complaint of acute pancreatitis (01 Oct 2023 05:51)      OVERNIGHT EVENTS:   No overnight events   Afebrile, hemodynamically stable     SUBJECTIVE/INTERVAL HPI: Patient seen and examined at bedside. Denies headache, vision changes, chest pain, shortness of breath, abdominal pain, nausea, vomiting, diarrhea, constipation, dysuria, swelling, and rash.   No acute events noted overnight.     PRESSORS: [ ] YES [X] NO  WHICH:    ICU Vital Signs Last 24 Hrs  T(C): 37.4 (01 Oct 2023 06:30), Max: 37.4 (01 Oct 2023 06:30)  T(F): 99.3 (01 Oct 2023 06:30), Max: 99.3 (01 Oct 2023 06:30)  HR: 132 (01 Oct 2023 07:00) (122 - 139)  BP: 115/79 (01 Oct 2023 07:00) (113/66 - 147/91)  BP(mean): 91 (01 Oct 2023 07:00) (91 - 103)  ABP: --  ABP(mean): --  RR: 21 (01 Oct 2023 07:00) (18 - 24)  SpO2: 100% (01 Oct 2023 07:00) (99% - 100%)    O2 Parameters below as of 01 Oct 2023 07:00  Patient On (Oxygen Delivery Method): room air          I&O's Summary    30 Sep 2023 07:01  -  01 Oct 2023 07:00  --------------------------------------------------------  IN: 0 mL / OUT: 400 mL / NET: -400 mL          PHYSICAL EXAM:  GENERAL: No acute distress   HEAD:  Atraumatic, Normocephalic  EYES: EOMI, PERRLA, conjunctiva and sclera clear  ENMT: No tonsillar erythema, exudates, or enlargement; Moist mucous membranes  NECK: Supple  HEART: Regular rate and rhythm; No murmurs, rubs, or gallops  RESPIRATORY: CTA B/l  ABDOMEN: Soft, Nontender, Nondistended; Bowel sounds present  NEUROLOGY: A&Ox3, nonfocal, moving all extremities  EXTREMITIES:  2+ Peripheral Pulses, No clubbing, cyanosis, or edema  SKIN: warm, dry, normal color, no rash or abnormal lesions    LABS:                        13.5   13.84 )-----------( 199      ( 30 Sep 2023 22:20 )             41.4     10-01    x   |  x   |  7   ----------------------------<  128<H>  x    |  8<LL>  |  1.34<H>    Ca    8.5      01 Oct 2023 05:46  Mg     2.1     10-01    TPro  9.0<H>  /  Alb  3.7  /  TBili  0.9  /  DBili  x   /  AST  x   /  ALT  93<H>  /  AlkPhos  119  10-01    PT/INR - ( 30 Sep 2023 22:20 )   PT: 11.9 sec;   INR: 1.05 ratio         PTT - ( 30 Sep 2023 22:20 )  PTT:31.8 sec  Urinalysis Basic - ( 01 Oct 2023 05:46 )    Color: x / Appearance: x / SG: x / pH: x  Gluc: 128 mg/dL / Ketone: x  / Bili: x / Urobili: x   Blood: x / Protein: x / Nitrite: x   Leuk Esterase: x / RBC: x / WBC x   Sq Epi: x / Non Sq Epi: x / Bacteria: x      CAPILLARY BLOOD GLUCOSE      POCT Blood Glucose.: 133 mg/dL (01 Oct 2023 05:39)  POCT Blood Glucose.: 127 mg/dL (01 Oct 2023 04:37)    ABG - ( 01 Oct 2023 07:03 )  pH, Arterial: 7.30  pH, Blood: x     /  pCO2: 21    /  pO2: 124   / HCO3: 10    / Base Excess: -13.9 /  SaO2: 100                 Consultant(s) Notes Reviewed:  [x ] YES  [ ] NO    MEDICATIONS  (STANDING):  albuterol    0.083%.. 2.5 milliGRAM(s) Nebulizer every 20 minutes  dextrose 5% + sodium chloride 0.9%. 1000 milliLiter(s) (150 mL/Hr) IV Continuous <Continuous>  influenza   Vaccine 0.5 milliLiter(s) IntraMuscular once  LORazepam   Injectable 2 milliGRAM(s) IV Push every 6 hours  LORazepam   Injectable   IV Push   sodium chloride 0.9% Bolus 1000 milliLiter(s) IV Bolus once  sodium chloride 0.9% Bolus 1000 milliLiter(s) IV Bolus once    MEDICATIONS  (PRN):  acetaminophen     Tablet .. 650 milliGRAM(s) Oral every 6 hours PRN Temp greater or equal to 38C (100.4F), Mild Pain (1 - 3)  LORazepam   Injectable 2 milliGRAM(s) IV Push every 1 hour PRN Agitation  morphine  - Injectable 2 milliGRAM(s) IV Push every 4 hours PRN Severe Pain (7 - 10)  ondansetron Injectable 4 milliGRAM(s) IV Push every 6 hours PRN Nausea and/or Vomiting      Care Discussed with Consultants/Other Providers [ x] YES  [ ] NO    RADIOLOGY & ADDITIONAL TESTS:

## 2023-10-01 NOTE — H&P ADULT - ASSESSMENT
ASSESSMENT      33 year old female from home with medical history of alcohol use disorder and necrotizing pancreatitis in 2017 presenting to ED for abdominal pain admitted for acute pancreatitis and hyperkalemia       _________CNS___________  #Pain  tylenol PRN for mild pain  Morphine for severe pain     #Etoh withdrawal  CIWA protocol         _________CVS___________  no active issues    _________RESP__________    no active issues    ___________GI____________  #Acute pancreatitis       ________ RENAL__________      __________MSK___________      ___________ID____________      _________ENDO__________      ______HEME/ONC_______      _________SKIN____________      ________PROPHY_______  #DVT  #GI     ______GOC/DISPO___________         ASSESSMENT      33 year old female from home with medical history of alcohol use disorder and necrotizing pancreatitis in 2017 presenting to ED for abdominal pain admitted for acute pancreatitis and hyperkalemia       _________CNS___________  #Pain  tylenol PRN for mild pain  Morphine for severe pain     #Etoh withdrawal  CIWA protocol   ativan taper  ativan PRN         _________CVS___________  -Sinus tachycardia  -likely in the setting of dehydration and cocaine use       _________RESP__________    no active issues    ___________GI____________  #Acute pancreatitis   -s/p 4L NS in ED   -c/w IVF D5NS 150cc/hr  -tylenol PRN for mild pain and morphine for severe pain  -advance diet as tolerated  -zofran PRN for n/v       ________ RENAL__________  #Metabolic Acidosis  -in the setting ot ETOH and possibly starvation ketoacidosis  -Hco3 8: s/p 1 amp of bicarb and 4L NS IVF  -c/w IVF D5NS maintenance fluids   -BMP q2h     #Hyperkalemia  K 7.3  s/p 5U insulin x2, 2g calcium gluconate, 15 g lokelma  s/p albuterol 10mg nebulizer treatment  no EKG changes  f/u repeat BMP stat     #hepatic steatosis  likely in the setting of ETOH use    #Positive Urine tox  -cocaine positive in urine toxicology. avoid beta blockers     _____________GU_____________  CT abdomen showing The uterine fundus appears enlarged; underlying fibroids or adenomyosis   is not excluded.  Suspected 1.5 cm dominant follicle in the right ovary.    -OBGYN followup outpatient         __________MSK___________  no active issues     ___________ID____________  no active issues     _________ENDO__________  no active issues     ______HEME/ONC_______  Leukocytosis  likely reactive. No signs of infection     _________SKIN____________  no active issues     ________PROPHY_______  #DVT  #GI     ______GOC/DISPO___________  ICU

## 2023-10-01 NOTE — PATIENT PROFILE ADULT - FALL HARM RISK - HARM RISK INTERVENTIONS
Assistance with ambulation/Assistance OOB with selected safe patient handling equipment/Communicate Risk of Fall with Harm to all staff/Reinforce activity limits and safety measures with patient and family/Review medications for side effects contributing to fall risk/Sit up slowly, dangle for a short time, stand at bedside before walking/Tailored Fall Risk Interventions/Toileting schedule using arm’s reach rule for commode and bathroom/Visual Cue: Yellow wristband and red socks/Bed in lowest position, wheels locked, appropriate side rails in place/Call bell, personal items and telephone in reach/Instruct patient to call for assistance before getting out of bed or chair/Non-slip footwear when patient is out of bed/[place] to call system/Physically safe environment - no spills, clutter or unnecessary equipment/Purposeful Proactive Rounding/Room/bathroom lighting operational, light cord in reach

## 2023-10-01 NOTE — PHARMACOTHERAPY INTERVENTION NOTE - NS PHARM COM OUTCOMES
Recommended that patient be re-weighed based on prior documented weight of 68 kg and current documented weight of 120.4 kg. It was identified that the patient's true weight is 54.6 kg

## 2023-10-01 NOTE — H&P ADULT - ATTENDING COMMENTS
Patient seen and examined in the ED upon consultation request at 4.25AM. Data reviewed. Case and plan of care discussed with resident.  33F with H/o ETOH abuse, pancreatitis  presented to the ED with abdominal/back pain and CT abdomen done showed acute pancreatitis. Her lab work showed lipase 218 with severe metabolic acidosis with HCO3 of 8 and severe hyperkalemia K 7.3, She was started on IV fluid boluses with LR and given cocktail for correction of hyperkalemia in addition to bicarbonate pushes in the ED., Accepted to ICU for further management and closer monitoring.     VS reviewed- BP stable, 's, RR 18-20, afebrile.  Young female in painful distress, Neuro: awake, alert, oriented x3, no focal deficits, HEENT- dry tongue and oral mucous membranes, Heart- tachycardic, no murmurs, Lungs- bilateral air entry, no added sounds, Abdomen: full, marked epigastric tenderness, Ext: no edema    Labs/Imagings reviewed. Leukocytosis WBC 13.8K, Na 130, K 7.3, HAGMA, HCO3 of 8, BUN/Cr 7/1.61, Lipase 218, Elevated LFTs- /, Alcohol <3, Acetone in blood- large.     Assessment  Acute pancreatitis from alcohol use  Severe high anion gap metabolic acidosis likely from combination of alocholic and starvation ketoacidosis.  Severe hyperkalemia  DANUTA, likely prerenal  Elevated LFTs likely from alcoholic hepatitis  Hypovolemic hyponatremia   Leukocytosis likely reactive  H/o ETOH abuse  H/o Pancreatitis    Plan  Accepted to ICU   Continue IV fluid boluses with LR to at least 5-6 liters and thereafter maintenance with D5LR 150ml/hr  Aggressive correction of hyperkalemia to target serum K <5.  Monitor renal function, BMP including HCO3, Mg, Phos q2-4hr and correct/replete as needed.  Obtain lactate and target <2  Pain management with IV morphine as needed for acute pancreatitis.  Start thiamine, folate and multivitamin.   Monitor for alcohol withdrawal, WA protocol.  Hepatitis profile, RUQ sonogram in view of elevated LFTs  Urine toxicology, Urinalysis.   DVT prophylaxis with Lovenox.

## 2023-10-01 NOTE — H&P ADULT - HISTORY OF PRESENT ILLNESS
HPI: 33 year old female from home with medical history of alcohol use disorder and necrotizing pancreatitis in 2017 presenting to ED for abdominal pain. Patient states that her abdominal pain started friday morning. She has been binge drinking since thursday. patient states that she normally drinks 2 bottles of vodka 4 times a day. Her last drink was on friday and since the abd pain started patient has not been able to eat or drink anything. She reports 3 episodes of NBNB vomiting that started today. patient denies any etoh withdrawal symptoms in the past. Patient denies any fevers, cough, chest pain, SOB, constipation, hematuria, dysuria.          PAST MEDICAL history :Ectopic pregnancy, necrotizing pancreatitis, etoh use, Right salpingectomy, 2009  SOCIAL HX: social cigarette smoker, etoh use 4x aweek per patient, marijuana use   FAMILY HISTORY:No known cardiovascular family history  Allergies: No Known Allergie    PHYSICAL EXAM    ICU Vital Signs Last 24 Hrs  T(C): 36.9 (01 Oct 2023 03:56), Max: 36.9 (01 Oct 2023 03:56)  T(F): 98.5 (01 Oct 2023 03:56), Max: 98.5 (01 Oct 2023 03:56)  HR: 122 (01 Oct 2023 03:56) (122 - 139)  BP: 130/83 (01 Oct 2023 03:56) (113/66 - 133/87)  BP(mean): --  ABP: --  ABP(mean): --  RR: 18 (01 Oct 2023 03:56) (18 - 22)  SpO2: 100% (01 Oct 2023 03:56) (99% - 100%)    O2 Parameters below as of 01 Oct 2023 03:56  Patient On (Oxygen Delivery Method): room air    General: anxious   HEENT:  pale conjunctiva          Lymphatic system: No LN  Lungs: Bilateral BS  Cardiovascular: tachycardia   Gastrointestinal: mild epigastric tenderness   Musculoskeletal: No clubbing.  Moves all extremities.    Skin: Warm.  Intact  Neurological: No motor or sensory deficit         LABS:                          13.5   13.84 )-----------( 199      ( 30 Sep 2023 22:20 )             41.4                                               10-01    130<L>  |  99  |  7   ----------------------------<  134<H>  7.3<HH>   |  8<LL>  |  1.61<H>    Ca    8.3<L>      01 Oct 2023 03:10    TPro  10.2<H>  /  Alb  4.1  /  TBili  0.7  /  DBili  x   /  AST  356<H>  /  ALT  115<H>  /  AlkPhos  132<H>  09-30      PT/INR - ( 30 Sep 2023 22:20 )   PT: 11.9 sec;   INR: 1.05 ratio         PTT - ( 30 Sep 2023 22:20 )  PTT:31.8 sec                                       Urinalysis Basic - ( 01 Oct 2023 03:10 )    Color: x / Appearance: x / SG: x / pH: x  Gluc: 134 mg/dL / Ketone: x  / Bili: x / Urobili: x   Blood: x / Protein: x / Nitrite: x   Leuk Esterase: x / RBC: x / WBC x   Sq Epi: x / Non Sq Epi: x / Bacteria: x                                                  LIVER FUNCTIONS - ( 30 Sep 2023 22:20 )  Alb: 4.1 g/dL / Pro: 10.2 g/dL / ALK PHOS: 132 U/L / ALT: 115 U/L DA / AST: 356 U/L / GGT: x                                                                                                                                       CXR:    ECHO:    MEDICATIONS  (STANDING):  albuterol    0.083%.. 2.5 milliGRAM(s) Nebulizer every 20 minutes  dextrose 5% + sodium chloride 0.9%. 1000 milliLiter(s) (150 mL/Hr) IV Continuous <Continuous>  sodium chloride 0.9% Bolus 1000 milliLiter(s) IV Bolus once    MEDICATIONS  (PRN):  morphine  - Injectable 2 milliGRAM(s) IV Push every 4 hours PRN Severe Pain (7 - 10)

## 2023-10-01 NOTE — PATIENT PROFILE ADULT - HOW PATIENT ADDRESSED, PROFILE
Topical Clindamycin Counseling: Patient counseled that this medication may cause skin irritation or allergic reactions.  In the event of skin irritation, the patient was advised to reduce the amount of the drug applied or use it less frequently.   The patient verbalized understanding of the proper use and possible adverse effects of clindamycin.  All of the patient's questions and concerns were addressed. Lucila

## 2023-10-02 LAB
ALBUMIN SERPL ELPH-MCNC: 2.9 G/DL — LOW (ref 3.5–5)
ALP SERPL-CCNC: 86 U/L — SIGNIFICANT CHANGE UP (ref 40–120)
ALT FLD-CCNC: 54 U/L DA — SIGNIFICANT CHANGE UP (ref 10–60)
ANION GAP SERPL CALC-SCNC: 7 MMOL/L — SIGNIFICANT CHANGE UP (ref 5–17)
AST SERPL-CCNC: 88 U/L — HIGH (ref 10–40)
BASOPHILS # BLD AUTO: 0.04 K/UL — SIGNIFICANT CHANGE UP (ref 0–0.2)
BASOPHILS NFR BLD AUTO: 0.4 % — SIGNIFICANT CHANGE UP (ref 0–2)
BILIRUB SERPL-MCNC: 0.8 MG/DL — SIGNIFICANT CHANGE UP (ref 0.2–1.2)
BUN SERPL-MCNC: 4 MG/DL — LOW (ref 7–18)
CALCIUM SERPL-MCNC: 8.1 MG/DL — LOW (ref 8.4–10.5)
CHLORIDE SERPL-SCNC: 108 MMOL/L — SIGNIFICANT CHANGE UP (ref 96–108)
CO2 SERPL-SCNC: 23 MMOL/L — SIGNIFICANT CHANGE UP (ref 22–31)
CREAT SERPL-MCNC: 0.63 MG/DL — SIGNIFICANT CHANGE UP (ref 0.5–1.3)
EGFR: 119 ML/MIN/1.73M2 — SIGNIFICANT CHANGE UP
EOSINOPHIL # BLD AUTO: 0.03 K/UL — SIGNIFICANT CHANGE UP (ref 0–0.5)
EOSINOPHIL NFR BLD AUTO: 0.3 % — SIGNIFICANT CHANGE UP (ref 0–6)
GLUCOSE SERPL-MCNC: 104 MG/DL — HIGH (ref 70–99)
HCT VFR BLD CALC: 30.6 % — LOW (ref 34.5–45)
HGB BLD-MCNC: 10.2 G/DL — LOW (ref 11.5–15.5)
IMM GRANULOCYTES NFR BLD AUTO: 0.4 % — SIGNIFICANT CHANGE UP (ref 0–0.9)
LYMPHOCYTES # BLD AUTO: 1.53 K/UL — SIGNIFICANT CHANGE UP (ref 1–3.3)
LYMPHOCYTES # BLD AUTO: 14.6 % — SIGNIFICANT CHANGE UP (ref 13–44)
MAGNESIUM SERPL-MCNC: 2 MG/DL — SIGNIFICANT CHANGE UP (ref 1.6–2.6)
MCHC RBC-ENTMCNC: 32.4 PG — SIGNIFICANT CHANGE UP (ref 27–34)
MCHC RBC-ENTMCNC: 33.3 GM/DL — SIGNIFICANT CHANGE UP (ref 32–36)
MCV RBC AUTO: 97.1 FL — SIGNIFICANT CHANGE UP (ref 80–100)
MONOCYTES # BLD AUTO: 1.04 K/UL — HIGH (ref 0–0.9)
MONOCYTES NFR BLD AUTO: 9.9 % — SIGNIFICANT CHANGE UP (ref 2–14)
NEUTROPHILS # BLD AUTO: 7.78 K/UL — HIGH (ref 1.8–7.4)
NEUTROPHILS NFR BLD AUTO: 74.4 % — SIGNIFICANT CHANGE UP (ref 43–77)
NRBC # BLD: 0 /100 WBCS — SIGNIFICANT CHANGE UP (ref 0–0)
PHOSPHATE SERPL-MCNC: 1.2 MG/DL — LOW (ref 2.5–4.5)
PHOSPHATE SERPL-MCNC: 2.4 MG/DL — LOW (ref 2.5–4.5)
PLATELET # BLD AUTO: 118 K/UL — LOW (ref 150–400)
POTASSIUM SERPL-MCNC: 3.9 MMOL/L — SIGNIFICANT CHANGE UP (ref 3.5–5.3)
POTASSIUM SERPL-SCNC: 3.9 MMOL/L — SIGNIFICANT CHANGE UP (ref 3.5–5.3)
PROT SERPL-MCNC: 6.8 G/DL — SIGNIFICANT CHANGE UP (ref 6–8.3)
RBC # BLD: 3.15 M/UL — LOW (ref 3.8–5.2)
RBC # FLD: 17.6 % — HIGH (ref 10.3–14.5)
SODIUM SERPL-SCNC: 138 MMOL/L — SIGNIFICANT CHANGE UP (ref 135–145)
WBC # BLD: 10.46 K/UL — SIGNIFICANT CHANGE UP (ref 3.8–10.5)
WBC # FLD AUTO: 10.46 K/UL — SIGNIFICANT CHANGE UP (ref 3.8–10.5)

## 2023-10-02 RX ORDER — HYDROMORPHONE HYDROCHLORIDE 2 MG/ML
2 INJECTION INTRAMUSCULAR; INTRAVENOUS; SUBCUTANEOUS ONCE
Refills: 0 | Status: DISCONTINUED | OUTPATIENT
Start: 2023-10-02 | End: 2023-10-02

## 2023-10-02 RX ORDER — HYDROMORPHONE HYDROCHLORIDE 2 MG/ML
2 INJECTION INTRAMUSCULAR; INTRAVENOUS; SUBCUTANEOUS EVERY 4 HOURS
Refills: 0 | Status: DISCONTINUED | OUTPATIENT
Start: 2023-10-02 | End: 2023-10-03

## 2023-10-02 RX ORDER — SODIUM,POTASSIUM PHOSPHATES 278-250MG
1 POWDER IN PACKET (EA) ORAL ONCE
Refills: 0 | Status: COMPLETED | OUTPATIENT
Start: 2023-10-02 | End: 2023-10-02

## 2023-10-02 RX ORDER — SODIUM,POTASSIUM PHOSPHATES 278-250MG
2 POWDER IN PACKET (EA) ORAL ONCE
Refills: 0 | Status: COMPLETED | OUTPATIENT
Start: 2023-10-02 | End: 2023-10-02

## 2023-10-02 RX ORDER — POTASSIUM PHOSPHATE, MONOBASIC POTASSIUM PHOSPHATE, DIBASIC 236; 224 MG/ML; MG/ML
30 INJECTION, SOLUTION INTRAVENOUS ONCE
Refills: 0 | Status: COMPLETED | OUTPATIENT
Start: 2023-10-02 | End: 2023-10-02

## 2023-10-02 RX ADMIN — SODIUM CHLORIDE 125 MILLILITER(S): 9 INJECTION, SOLUTION INTRAVENOUS at 02:42

## 2023-10-02 RX ADMIN — Medication 105 MILLIGRAM(S): at 22:12

## 2023-10-02 RX ADMIN — HYDROMORPHONE HYDROCHLORIDE 2 MILLIGRAM(S): 2 INJECTION INTRAMUSCULAR; INTRAVENOUS; SUBCUTANEOUS at 18:23

## 2023-10-02 RX ADMIN — Medication 1 TABLET(S): at 11:28

## 2023-10-02 RX ADMIN — Medication 2 PACKET(S): at 12:56

## 2023-10-02 RX ADMIN — HYDROMORPHONE HYDROCHLORIDE 2 MILLIGRAM(S): 2 INJECTION INTRAMUSCULAR; INTRAVENOUS; SUBCUTANEOUS at 16:36

## 2023-10-02 RX ADMIN — Medication 650 MILLIGRAM(S): at 09:32

## 2023-10-02 RX ADMIN — HYDROMORPHONE HYDROCHLORIDE 2 MILLIGRAM(S): 2 INJECTION INTRAMUSCULAR; INTRAVENOUS; SUBCUTANEOUS at 09:42

## 2023-10-02 RX ADMIN — Medication 105 MILLIGRAM(S): at 06:19

## 2023-10-02 RX ADMIN — HYDROMORPHONE HYDROCHLORIDE 2 MILLIGRAM(S): 2 INJECTION INTRAMUSCULAR; INTRAVENOUS; SUBCUTANEOUS at 22:12

## 2023-10-02 RX ADMIN — POTASSIUM PHOSPHATE, MONOBASIC POTASSIUM PHOSPHATE, DIBASIC 83.33 MILLIMOLE(S): 236; 224 INJECTION, SOLUTION INTRAVENOUS at 05:48

## 2023-10-02 RX ADMIN — Medication 650 MILLIGRAM(S): at 09:53

## 2023-10-02 RX ADMIN — Medication 105 MILLIGRAM(S): at 15:52

## 2023-10-02 RX ADMIN — Medication 650 MILLIGRAM(S): at 03:30

## 2023-10-02 RX ADMIN — HYDROMORPHONE HYDROCHLORIDE 2 MILLIGRAM(S): 2 INJECTION INTRAMUSCULAR; INTRAVENOUS; SUBCUTANEOUS at 12:23

## 2023-10-02 RX ADMIN — Medication 650 MILLIGRAM(S): at 03:00

## 2023-10-02 RX ADMIN — Medication 1 PACKET(S): at 05:48

## 2023-10-02 RX ADMIN — Medication 62.5 MILLIMOLE(S): at 18:14

## 2023-10-02 RX ADMIN — Medication 1 MILLIGRAM(S): at 11:28

## 2023-10-02 NOTE — PROGRESS NOTE ADULT - SUBJECTIVE AND OBJECTIVE BOX
Patient is a 33y old  Female who presents with a chief complaint of acute pancreatitis (01 Oct 2023 07:25)      OVERNIGHT EVENTS:   No overnight events   Afebrile, hemodynamically stable     SUBJECTIVE/INTERVAL HPI: Patient seen and examined at bedside. Denies headache, vision changes, chest pain, shortness of breath, abdominal pain, nausea, vomiting, diarrhea, constipation, dysuria, swelling, and rash.      PRESSORS: [ ] YES [ ] NO  WHICH:    ICU Vital Signs Last 24 Hrs  T(C): 37.2 (02 Oct 2023 04:00), Max: 37.7 (02 Oct 2023 00:00)  T(F): 99 (02 Oct 2023 04:00), Max: 99.9 (02 Oct 2023 00:00)  HR: 104 (02 Oct 2023 07:00) (86 - 134)  BP: 127/84 (02 Oct 2023 07:00) (122/72 - 206/167)  BP(mean): 96 (02 Oct 2023 07:00) (86 - 181)  ABP: --  ABP(mean): --  RR: 18 (02 Oct 2023 07:00) (16 - 26)  SpO2: 100% (02 Oct 2023 07:00) (99% - 100%)    O2 Parameters below as of 02 Oct 2023 07:00  Patient On (Oxygen Delivery Method): room air          I&O's Summary    01 Oct 2023 07:01  -  02 Oct 2023 07:00  --------------------------------------------------------  IN: 4183.1 mL / OUT: 750 mL / NET: 3433.1 mL          PHYSICAL EXAM:  GENERAL: No acute distress   HEAD:  Atraumatic, Normocephalic  EYES: EOMI, PERRLA, conjunctiva and sclera clear  ENMT: No tonsillar erythema, exudates, or enlargement; Moist mucous membranes  NECK: Supple, No JVD, Normal thyroid  HEART: Regular rate and rhythm; No murmurs, rubs, or gallops  RESPIRATORY: CTA B/L, No W/R/R  ABDOMEN: Soft, Nontender, Nondistended; Bowel sounds present  NEUROLOGY: A&Ox3, nonfocal, moving all extremities  EXTREMITIES:  2+ Peripheral Pulses, No clubbing, cyanosis, or edema  SKIN: warm, dry, normal color, no rash or abnormal lesions    LABS:                        10.2   10.46 )-----------( 118      ( 02 Oct 2023 03:24 )             30.6     10-02    138  |  108  |  4<L>  ----------------------------<  104<H>  3.9   |  23  |  0.63    Ca    8.1<L>      02 Oct 2023 03:24  Phos  1.2     10-02  Mg     2.0     10-02    TPro  6.8  /  Alb  2.9<L>  /  TBili  0.8  /  DBili  x   /  AST  88<H>  /  ALT  54  /  AlkPhos  86  10-02    PT/INR - ( 30 Sep 2023 22:20 )   PT: 11.9 sec;   INR: 1.05 ratio         PTT - ( 30 Sep 2023 22:20 )  PTT:31.8 sec  Urinalysis Basic - ( 02 Oct 2023 03:24 )    Color: x / Appearance: x / SG: x / pH: x  Gluc: 104 mg/dL / Ketone: x  / Bili: x / Urobili: x   Blood: x / Protein: x / Nitrite: x   Leuk Esterase: x / RBC: x / WBC x   Sq Epi: x / Non Sq Epi: x / Bacteria: x      CAPILLARY BLOOD GLUCOSE      POCT Blood Glucose.: 126 mg/dL (01 Oct 2023 16:48)  POCT Blood Glucose.: 138 mg/dL (01 Oct 2023 13:29)    ABG - ( 01 Oct 2023 13:44 )  pH, Arterial: 7.46  pH, Blood: x     /  pCO2: 29    /  pO2: 125   / HCO3: 21    / Base Excess: -2.1  /  SaO2: 99                  Consultant(s) Notes Reviewed:  [x ] YES  [ ] NO    MEDICATIONS  (STANDING):  albuterol    0.083%.. 2.5 milliGRAM(s) Nebulizer every 20 minutes  folic acid 1 milliGRAM(s) Oral daily  lactated ringers. 1000 milliLiter(s) (125 mL/Hr) IV Continuous <Continuous>  multivitamin 1 Tablet(s) Oral daily  thiamine IVPB 500 milliGRAM(s) IV Intermittent every 8 hours    MEDICATIONS  (PRN):  acetaminophen     Tablet .. 650 milliGRAM(s) Oral every 6 hours PRN Temp greater or equal to 38C (100.4F), Mild Pain (1 - 3)  LORazepam   Injectable 2 milliGRAM(s) IV Push every 2 hours PRN Symptom-triggered: each CIWA -Ar score 8 or GREATER  ondansetron Injectable 4 milliGRAM(s) IV Push every 6 hours PRN Nausea and/or Vomiting      Care Discussed with Consultants/Other Providers [ x] YES  [ ] NO    RADIOLOGY & ADDITIONAL TESTS: Patient is a 33y old  Female who presents with a chief complaint of acute pancreatitis (01 Oct 2023 07:25)      OVERNIGHT EVENTS: No overnight events. Afebrile, hemodynamically stable.      SUBJECTIVE/INTERVAL HPI: Patient seen and examined at bedside. Patient complaining of throat pain when swallowing. Patient complaining of bilateral lower quadrant pain. Patient denies N/V, bowel movement.     PRESSORS: [ ] YES [x] NO  WHICH:    ICU Vital Signs Last 24 Hrs  T(C): 37.2 (02 Oct 2023 04:00), Max: 37.7 (02 Oct 2023 00:00)  T(F): 99 (02 Oct 2023 04:00), Max: 99.9 (02 Oct 2023 00:00)  HR: 104 (02 Oct 2023 07:00) (86 - 134)  BP: 127/84 (02 Oct 2023 07:00) (122/72 - 206/167)  BP(mean): 96 (02 Oct 2023 07:00) (86 - 181)  ABP: --  ABP(mean): --  RR: 18 (02 Oct 2023 07:00) (16 - 26)  SpO2: 100% (02 Oct 2023 07:00) (99% - 100%)    O2 Parameters below as of 02 Oct 2023 07:00  Patient On (Oxygen Delivery Method): room air          I&O's Summary    01 Oct 2023 07:01  -  02 Oct 2023 07:00  --------------------------------------------------------  IN: 4183.1 mL / OUT: 750 mL / NET: 3433.1 mL          PHYSICAL EXAM:  GENERAL: No acute distress   HEAD:  Atraumatic, Normocephalic  EYES: EOMI, PERRLA, conjunctiva and sclera clear  ENMT: No tonsillar erythema, exudates, or enlargement; Moist mucous membranes  NECK: Supple, No JVD, Normal thyroid  HEART: Regular rate and rhythm; Tachycardia improving; No murmurs, rubs, or gallops  RESPIRATORY: CTA B/L, No W/R/R  ABDOMEN: Soft, Nontender, Nondistended; Bowel sounds present  NEUROLOGY: A&Ox3, nonfocal, moving all extremities  EXTREMITIES:  2+ Peripheral Pulses, Right hand edema from IV infiltration; No clubbing, cyanosis  SKIN: warm, dry, normal color, no rash or abnormal lesions    LABS:                        10.2   10.46 )-----------( 118      ( 02 Oct 2023 03:24 )             30.6     10-02    138  |  108  |  4<L>  ----------------------------<  104<H>  3.9   |  23  |  0.63    Ca    8.1<L>      02 Oct 2023 03:24  Phos  1.2     10-02  Mg     2.0     10-02    TPro  6.8  /  Alb  2.9<L>  /  TBili  0.8  /  DBili  x   /  AST  88<H>  /  ALT  54  /  AlkPhos  86  10-02    PT/INR - ( 30 Sep 2023 22:20 )   PT: 11.9 sec;   INR: 1.05 ratio         PTT - ( 30 Sep 2023 22:20 )  PTT:31.8 sec  Urinalysis Basic - ( 02 Oct 2023 03:24 )    Color: x / Appearance: x / SG: x / pH: x  Gluc: 104 mg/dL / Ketone: x  / Bili: x / Urobili: x   Blood: x / Protein: x / Nitrite: x   Leuk Esterase: x / RBC: x / WBC x   Sq Epi: x / Non Sq Epi: x / Bacteria: x      CAPILLARY BLOOD GLUCOSE      POCT Blood Glucose.: 126 mg/dL (01 Oct 2023 16:48)  POCT Blood Glucose.: 138 mg/dL (01 Oct 2023 13:29)    ABG - ( 01 Oct 2023 13:44 )  pH, Arterial: 7.46  pH, Blood: x     /  pCO2: 29    /  pO2: 125   / HCO3: 21    / Base Excess: -2.1  /  SaO2: 99              Consultant(s) Notes Reviewed:  [x ] YES  [ ] NO    MEDICATIONS  (STANDING):  albuterol    0.083%.. 2.5 milliGRAM(s) Nebulizer every 20 minutes  folic acid 1 milliGRAM(s) Oral daily  lactated ringers. 1000 milliLiter(s) (125 mL/Hr) IV Continuous <Continuous>  multivitamin 1 Tablet(s) Oral daily  thiamine IVPB 500 milliGRAM(s) IV Intermittent every 8 hours    MEDICATIONS  (PRN):  acetaminophen     Tablet .. 650 milliGRAM(s) Oral every 6 hours PRN Temp greater or equal to 38C (100.4F), Mild Pain (1 - 3)  LORazepam   Injectable 2 milliGRAM(s) IV Push every 2 hours PRN Symptom-triggered: each CIWA -Ar score 8 or GREATER  ondansetron Injectable 4 milliGRAM(s) IV Push every 6 hours PRN Nausea and/or Vomiting      Care Discussed with Consultants/Other Providers [ x] YES  [ ] NO    RADIOLOGY & ADDITIONAL TESTS: Patient is a 33y old  Female who presents with a chief complaint of acute pancreatitis (01 Oct 2023 07:25)      OVERNIGHT EVENTS: No overnight events. Afebrile, hemodynamically stable.      SUBJECTIVE/INTERVAL HPI: Patient seen and examined at bedside. Patient complaining of throat pain when swallowing. Patient complaining of bilateral lower quadrant pain, period cramps. Patient denies N/V, bowel movement.     PRESSORS: [ ] YES [x] NO  WHICH:    ICU Vital Signs Last 24 Hrs  T(C): 37.2 (02 Oct 2023 04:00), Max: 37.7 (02 Oct 2023 00:00)  T(F): 99 (02 Oct 2023 04:00), Max: 99.9 (02 Oct 2023 00:00)  HR: 104 (02 Oct 2023 07:00) (86 - 134)  BP: 127/84 (02 Oct 2023 07:00) (122/72 - 206/167)  BP(mean): 96 (02 Oct 2023 07:00) (86 - 181)  ABP: --  ABP(mean): --  RR: 18 (02 Oct 2023 07:00) (16 - 26)  SpO2: 100% (02 Oct 2023 07:00) (99% - 100%)    O2 Parameters below as of 02 Oct 2023 07:00  Patient On (Oxygen Delivery Method): room air        I&O's Summary    01 Oct 2023 07:01  -  02 Oct 2023 07:00  --------------------------------------------------------  IN: 4183.1 mL / OUT: 750 mL / NET: 3433.1 mL          PHYSICAL EXAM:  GENERAL: No acute distress   HEAD:  Atraumatic, Normocephalic  EYES: EOMI, PERRLA, conjunctiva and sclera clear   ENMT: No tonsillar erythema, exudates, or enlargement; Moist mucous membranes  NECK: Supple, No JVD,   HEART: Regular rate and rhythm; Tachycardia improving; No murmurs, rubs, or gallops  RESPIRATORY: CTA B/L, No W/R/R  ABDOMEN: Soft, Nontender, Nondistended; Bowel sounds present  NEUROLOGY: A&Ox3, nonfocal, moving all extremities  EXTREMITIES:  2+ Peripheral Pulses, Right hand edema from IV infiltration; No clubbing, cyanosis  SKIN: warm, dry, normal color, no rash or abnormal lesions    LABS:                        10.2   10.46 )-----------( 118      ( 02 Oct 2023 03:24 )             30.6     10-02    138  |  108  |  4<L>  ----------------------------<  104<H>  3.9   |  23  |  0.63    Ca    8.1<L>      02 Oct 2023 03:24  Phos  1.2     10-02  Mg     2.0     10-02    TPro  6.8  /  Alb  2.9<L>  /  TBili  0.8  /  DBili  x   /  AST  88<H>  /  ALT  54  /  AlkPhos  86  10-02    PT/INR - ( 30 Sep 2023 22:20 )   PT: 11.9 sec;   INR: 1.05 ratio         PTT - ( 30 Sep 2023 22:20 )  PTT:31.8 sec  Urinalysis Basic - ( 02 Oct 2023 03:24 )    Color: x / Appearance: x / SG: x / pH: x  Gluc: 104 mg/dL / Ketone: x  / Bili: x / Urobili: x   Blood: x / Protein: x / Nitrite: x   Leuk Esterase: x / RBC: x / WBC x   Sq Epi: x / Non Sq Epi: x / Bacteria: x      CAPILLARY BLOOD GLUCOSE      POCT Blood Glucose.: 126 mg/dL (01 Oct 2023 16:48)  POCT Blood Glucose.: 138 mg/dL (01 Oct 2023 13:29)    ABG - ( 01 Oct 2023 13:44 )  pH, Arterial: 7.46  pH, Blood: x     /  pCO2: 29    /  pO2: 125   / HCO3: 21    / Base Excess: -2.1  /  SaO2: 99              Consultant(s) Notes Reviewed:  [x ] YES  [ ] NO    MEDICATIONS  (STANDING):  albuterol    0.083%.. 2.5 milliGRAM(s) Nebulizer every 20 minutes  folic acid 1 milliGRAM(s) Oral daily  lactated ringers. 1000 milliLiter(s) (125 mL/Hr) IV Continuous <Continuous>  multivitamin 1 Tablet(s) Oral daily  thiamine IVPB 500 milliGRAM(s) IV Intermittent every 8 hours    MEDICATIONS  (PRN):  acetaminophen     Tablet .. 650 milliGRAM(s) Oral every 6 hours PRN Temp greater or equal to 38C (100.4F), Mild Pain (1 - 3)  LORazepam   Injectable 2 milliGRAM(s) IV Push every 2 hours PRN Symptom-triggered: each CIWA -Ar score 8 or GREATER  ondansetron Injectable 4 milliGRAM(s) IV Push every 6 hours PRN Nausea and/or Vomiting      Care Discussed with Consultants/Other Providers [ x] YES  [ ] NO    RADIOLOGY & ADDITIONAL TESTS:

## 2023-10-02 NOTE — PROGRESS NOTE ADULT - ATTENDING COMMENTS
IMP: This is a 33 yr old woman  with H/o ETOH abuse, pancreatitis  presented to the ED with abdominal/back pain and CT abdomen done showed acute pancreatitis. Her lab work showed lipase 218 with severe metabolic acidosis with HCO3 of 8 and severe hyperkalemia K 7.3, She was started on IV fluid boluses with LR and given cocktail for correction of hyperkalemia in addition to bicarbonate pushes in the ED., Accepted to ICU for further management and closer monitoring.     Assessment  Acute pancreatitis from alcohol use  Severe high anion gap metabolic acidosis likely from combination of alcoholic and starvation ketoacidosis.  Severe hyperkalemia  Polysubstance abuse - cocaine +  DANUTA, likely prerenal  Transaminitis likely from alcoholic hepatitis  Hypovolemic hyponatremia   Leukocytosis likely reactive  H/o ETOH abuse  H/o Pancreatitis    Plan  Sodium bicarb supplementation  Cont. IV fluid hydration   Potassium improving   Monitor renal function, creatinine is downtrending consistent with prerenal etiology  Lactate is normal  Osmolal gap normal  Pain management with IV morphine as needed for acute pancreatitis.  Cont. thiamine, folate and multivitamin.   Monitor for alcohol withdrawal, CIWA protoco for CIWA > 8  Pain control   Hepatitis profile  DVT prophylaxis with Lovenox

## 2023-10-02 NOTE — CHART NOTE - NSCHARTNOTEFT_GEN_A_CORE
33 year old female from home with medical history of alcohol use disorder and necrotizing pancreatitis in 2013 and acute pancreatitis 2017, 2019 presenting to ED for abdominal pain admitted for acute pancreatitis and hyperkalemia. Blood alcohol level <3, tox screen positive for Cocaine.     ICU course   10/1 Hyperkalemia K 7.3, Hyperkalemia cocktail given. K+ normalized. HCO3 8. sodium bicarb 50mEq x2 given. No change. Patient put on bicarbonate drip. HCO3 normalized to 21. Bicarb drip was discontinued. Advanced diet to CLD. Pt complained of upper abdominal pain and lower chest pain, ECG was performed which showed sinus tachycardia without any ST or T wave changes. Pain improved after pain medication provided. Hypomagnesemia, hypokalemia and hypophosphatemia developed likely d/t refeeding syndrome. Mag sulfate 2g given. Sodium phosphate 30 mmol given.10/2 Mag and potassium normalized. Phosphate low. Replenished with potassium phos x3. Pt diet advanced to regular diet and tolerating well.     Signed out to  ---- and PGY1 Resident  -----.       Things to follow:   - c/w PRN ativan for CIWA >8, thiamine, multivitamins   - avoid beta blockers as hx of cocaine abuse   - social work consult for alcohol use disorder 33 year old female from home with medical history of alcohol use disorder and necrotizing pancreatitis in 2013 and acute pancreatitis 2017, 2019 presenting to ED for abdominal pain admitted for acute pancreatitis and hyperkalemia. Blood alcohol level <3, tox screen positive for Cocaine.     ICU course   10/1 Hyperkalemia K 7.3, Hyperkalemia cocktail given. K+ normalized. HCO3 8. sodium bicarb 50mEq x2 given. No change. Patient put on bicarbonate drip. HCO3 normalized to 21. Bicarb drip was discontinued. Advanced diet to CLD. Pt complained of upper abdominal pain and lower chest pain, ECG was performed which showed sinus tachycardia without any ST or T wave changes. Pain improved after pain medication provided. Hypomagnesemia, hypokalemia and hypophosphatemia developed likely d/t refeeding syndrome. Mag sulfate 2g given. Sodium phosphate 30 mmol given.10/2 Mag and potassium normalized. Phosphate low. Replenished with potassium phos x3. Pt diet advanced to regular diet and tolerating well.     Pt is stable to be downgraded to regular medicine floor for the rest of the management. Signed out to  ---- and NP Trey Ayoub        Things to follow:   - Monitor for electrolyte deficiency erwin hypophosphatemia and replete accordingly   - c/w PRN ativan for CIWA >8, thiamine, multivitamins   - avoid beta blockers as hx of cocaine abuse   - social work consult for alcohol use disorder 33 year old female from home with medical history of alcohol use disorder and necrotizing pancreatitis in 2013 and acute pancreatitis 2017, 2019 presenting to ED for abdominal pain admitted for acute pancreatitis and hyperkalemia. Blood alcohol level <3, tox screen positive for Cocaine.     ICU course   10/1 Hyperkalemia K 7.3, Hyperkalemia cocktail given. K+ normalized. HCO3 8. sodium bicarb 50mEq x2 given. No change. Patient put on bicarbonate drip. HCO3 normalized to 21. Bicarb drip was discontinued. Advanced diet to CLD. Pt complained of upper abdominal pain and lower chest pain, ECG was performed which showed sinus tachycardia without any ST or T wave changes. Pain improved after pain medication provided. Hypomagnesemia, hypokalemia and hypophosphatemia developed likely d/t refeeding syndrome. Mag sulfate 2g given. Sodium phosphate 30 mmol given.10/2 Mag and potassium normalized. Phosphate low. Replenished with potassium phos x3. Pt diet advanced to regular diet and tolerating well.     Pt is stable to be downgraded to regular medicine floor for the rest of the management. Signed out to Dr. Banks and NP Trey Ayoub        Things to follow:   - Monitor for electrolyte deficiency erwin hypophosphatemia and replete accordingly   - c/w PRN ativan for CIWA >8, thiamine, multivitamins   - avoid beta blockers as hx of cocaine abuse   - social work consult for alcohol use disorder

## 2023-10-02 NOTE — PROGRESS NOTE ADULT - ASSESSMENT
33 year old female from home with medical history of alcohol use disorder and necrotizing pancreatitis in 2017 presenting to ED for abdominal pain admitted for acute pancreatitis and hyperkalemia.     #Metabolic acidosis   #Acute alcohol withdrawal  #Hyperkalemia  #Acute pancreatitis     _________CNS___________  #Pain  tylenol PRN for mild pain  Tramadol     #Etoh withdrawal  CIWA protocol   ativan PRN   folic acid and thiamine       _________CVS___________  #Sinus tachycardia  -likely in the setting of dehydration, compensatory for metabolic acidosis   - avoid beta blockers     _________RESP__________    no active issues    ___________GI____________  #Acute pancreatitis   -s/p 4L NS in ED   -tylenol PRN for mild pain and morphine for severe pain  -advance diet as tolerated  -zofran PRN for n/v       ________ RENAL__________  #Metabolic Acidosis  -in the setting ot ETOH and possibly starvation ketoacidosis  -Hco3 8: s/p 1 amp of bicarb and 4L NS IVF  - s/p bicarbonate drip > HCO3 21    #Hyperkalemia  (resolved)   K 7.3, s/p hyperkalemic cocktail  - 3.9 > 3.1  ? drop in the setting of pH increase     #hepatic steatosis  likely in the setting of ETOH use    #Positive Urine tox  -cocaine positive in urine toxicology. avoid beta blockers     _____________GU_____________  CT abdomen showing The uterine fundus appears enlarged; underlying fibroids or adenomyosis   is not excluded.  Suspected 1.5 cm dominant follicle in the right ovary.    -OBGYN followup outpatient       __________MSK___________  no active issues     ___________ID____________  no active issues     _________ENDO__________  no active issues     ______HEME/ONC_______  Leukocytosis  likely reactive. No signs of infection     _________SKIN____________  no active issues     ________PROPHY_______  #DVT  #GI     ______GOC/DISPO___________  ICU      33 year old female from home with medical history of alcohol use disorder and necrotizing pancreatitis in 2017 presenting to ED for abdominal pain admitted for acute pancreatitis and hyperkalemia.     #Metabolic acidosis   #Acute alcohol withdrawal  #Hyperkalemia  #Acute pancreatitis     _________CNS___________  #Pain  tylenol PRN for mild pain  Tramadol     #Etoh withdrawal  CIWA protocol   ativan PRN   folic acid and thiamine       _________CVS___________  #Sinus tachycardia  -likely in the setting of dehydration, compensatory for metabolic acidosis   - avoid beta blockers     _________RESP__________    no active issues    ___________GI____________  #Acute pancreatitis   -s/p 4L NS in ED   -tylenol PRN for mild pain and morphine for severe pain  -advance diet as tolerated  -zofran PRN for n/v       ________ RENAL__________  #Metabolic Acidosis  -in the setting ot ETOH and possibly starvation ketoacidosis  -Hco3 8: s/p 1 amp of bicarb and 4L NS IVF  - s/p bicarbonate drip > HCO3 21    #Hyperkalemia  (resolved)   K 7.3, s/p hyperkalemic cocktail  ? drop in the setting of pH increase   - 3.9 > 3.1 > 3.0 >3.9  RESOLVED     #Electrolyte imbalance   ?refeeding syndrome   HypoMg 1.2, Hypophos 0.9  Replete as needed   Improving       #hepatic steatosis  likely in the setting of ETOH use    #Positive Urine tox  -cocaine positive in urine toxicology. avoid beta blockers     _____________GU_____________  CT abdomen showing The uterine fundus appears enlarged; underlying fibroids or adenomyosis   is not excluded.  Suspected 1.5 cm dominant follicle in the right ovary.    -OBGYN followup outpatient  -currently on her period       __________MSK___________  no active issues     ___________ID____________  no active issues     _________ENDO__________  no active issues     ______HEME/ONC_______  Leukocytosis  likely reactive. No signs of infection     _________SKIN____________  no active issues     ________PROPHY_______  #DVT  #GI     ______GOC/DISPO___________  ICU      33 year old female from home with medical history of alcohol use disorder and necrotizing pancreatitis in 2017 presenting to ED for abdominal pain admitted for acute pancreatitis and hyperkalemia.     #Metabolic acidosis   #Acute alcohol withdrawal  #Hyperkalemia  #Acute pancreatitis     _________CNS___________  #Pain  tylenol PRN for mild pain  dilaudid PRN for moderate pain      #Etoh withdrawal  no ativan given overnight    c/w folic acid and thiamine       _________CVS___________  #Sinus tachycardia  -likely in the setting of dehydration, compensatory for metabolic acidosis   - avoid beta blockers   RESOLVED     _________RESP__________    no active issues    ___________GI____________  #Acute pancreatitis   -s/p 4L NS in ED   -advance diet as tolerated  -zofran PRN for n/v       ________ RENAL__________  #Metabolic Acidosis  -in the setting ot ETOH and possibly starvation ketoacidosis  -Hco3 8: s/p 1 amp of bicarb and 4L NS IVF  - s/p bicarbonate drip > HCO3 21  RESOLVED     #Hyperkalemia  (resolved)   K 7.3, s/p hyperkalemic cocktail  RESOLVED     #Electrolyte imbalance   ?refeeding syndrome  HypoK 3.9 > 3.1 > 3.0 >3.9   HypoMg 1.2 > 2.0  Hypophos 0.9 >1.4 > 1.9>1.2  Replete as needed   Improving       #hepatic steatosis  likely in the setting of ETOH use    #Positive Urine tox  -cocaine positive in urine toxicology. avoid beta blockers     _____________GU_____________  CT abdomen showing The uterine fundus appears enlarged; underlying fibroids or adenomyosis   is not excluded.  Suspected 1.5 cm dominant follicle in the right ovary.    -OBGYN followup outpatient  -currently on her period       __________MSK___________  no active issues     ___________ID____________  no active issues     _________ENDO__________  no active issues     ______HEME/ONC_______  Leukocytosis  likely reactive. No signs of infection     _________SKIN____________  no active issues     ________PROPHY_______       ______GOC/DISPO___________  ICU

## 2023-10-03 DIAGNOSIS — E87.20 ACIDOSIS, UNSPECIFIED: ICD-10-CM

## 2023-10-03 DIAGNOSIS — K85.90 ACUTE PANCREATITIS WITHOUT NECROSIS OR INFECTION, UNSPECIFIED: ICD-10-CM

## 2023-10-03 DIAGNOSIS — E87.5 HYPERKALEMIA: ICD-10-CM

## 2023-10-03 DIAGNOSIS — E87.8 OTHER DISORDERS OF ELECTROLYTE AND FLUID BALANCE, NOT ELSEWHERE CLASSIFIED: ICD-10-CM

## 2023-10-03 DIAGNOSIS — F10.10 ALCOHOL ABUSE, UNCOMPLICATED: ICD-10-CM

## 2023-10-03 LAB
ALBUMIN SERPL ELPH-MCNC: 3.1 G/DL — LOW (ref 3.5–5)
ALP SERPL-CCNC: 92 U/L — SIGNIFICANT CHANGE UP (ref 40–120)
ALT FLD-CCNC: 46 U/L DA — SIGNIFICANT CHANGE UP (ref 10–60)
ANION GAP SERPL CALC-SCNC: 7 MMOL/L — SIGNIFICANT CHANGE UP (ref 5–17)
ANISOCYTOSIS BLD QL: SLIGHT — SIGNIFICANT CHANGE UP
AST SERPL-CCNC: 58 U/L — HIGH (ref 10–40)
BASOPHILS # BLD AUTO: 0.04 K/UL — SIGNIFICANT CHANGE UP (ref 0–0.2)
BASOPHILS NFR BLD AUTO: 0.8 % — SIGNIFICANT CHANGE UP (ref 0–2)
BILIRUB SERPL-MCNC: 0.6 MG/DL — SIGNIFICANT CHANGE UP (ref 0.2–1.2)
BUN SERPL-MCNC: 3 MG/DL — LOW (ref 7–18)
CALCIUM SERPL-MCNC: 8.2 MG/DL — LOW (ref 8.4–10.5)
CHLORIDE SERPL-SCNC: 102 MMOL/L — SIGNIFICANT CHANGE UP (ref 96–108)
CO2 SERPL-SCNC: 29 MMOL/L — SIGNIFICANT CHANGE UP (ref 22–31)
CREAT SERPL-MCNC: 0.51 MG/DL — SIGNIFICANT CHANGE UP (ref 0.5–1.3)
EGFR: 126 ML/MIN/1.73M2 — SIGNIFICANT CHANGE UP
EOSINOPHIL # BLD AUTO: 0.12 K/UL — SIGNIFICANT CHANGE UP (ref 0–0.5)
EOSINOPHIL NFR BLD AUTO: 2.3 % — SIGNIFICANT CHANGE UP (ref 0–6)
GLUCOSE SERPL-MCNC: 154 MG/DL — HIGH (ref 70–99)
HCT VFR BLD CALC: 32 % — LOW (ref 34.5–45)
HGB BLD-MCNC: 10.6 G/DL — LOW (ref 11.5–15.5)
HYPOCHROMIA BLD QL: SLIGHT — SIGNIFICANT CHANGE UP
IMM GRANULOCYTES NFR BLD AUTO: 0.4 % — SIGNIFICANT CHANGE UP (ref 0–0.9)
LG PLATELETS BLD QL AUTO: SLIGHT — SIGNIFICANT CHANGE UP
LYMPHOCYTES # BLD AUTO: 1.36 K/UL — SIGNIFICANT CHANGE UP (ref 1–3.3)
LYMPHOCYTES # BLD AUTO: 26.2 % — SIGNIFICANT CHANGE UP (ref 13–44)
MACROCYTES BLD QL: SIGNIFICANT CHANGE UP
MAGNESIUM SERPL-MCNC: 1.9 MG/DL — SIGNIFICANT CHANGE UP (ref 1.6–2.6)
MANUAL SMEAR VERIFICATION: SIGNIFICANT CHANGE UP
MCHC RBC-ENTMCNC: 32.5 PG — SIGNIFICANT CHANGE UP (ref 27–34)
MCHC RBC-ENTMCNC: 33.1 GM/DL — SIGNIFICANT CHANGE UP (ref 32–36)
MCV RBC AUTO: 98.2 FL — SIGNIFICANT CHANGE UP (ref 80–100)
MONOCYTES # BLD AUTO: 0.93 K/UL — HIGH (ref 0–0.9)
MONOCYTES NFR BLD AUTO: 17.9 % — HIGH (ref 2–14)
NEUTROPHILS # BLD AUTO: 2.72 K/UL — SIGNIFICANT CHANGE UP (ref 1.8–7.4)
NEUTROPHILS NFR BLD AUTO: 52.4 % — SIGNIFICANT CHANGE UP (ref 43–77)
NRBC # BLD: 0 /100 WBCS — SIGNIFICANT CHANGE UP (ref 0–0)
PHOSPHATE SERPL-MCNC: 3.1 MG/DL — SIGNIFICANT CHANGE UP (ref 2.5–4.5)
PLAT MORPH BLD: NORMAL — SIGNIFICANT CHANGE UP
PLATELET # BLD AUTO: 132 K/UL — LOW (ref 150–400)
POIKILOCYTOSIS BLD QL AUTO: SLIGHT — SIGNIFICANT CHANGE UP
POTASSIUM SERPL-MCNC: 3.3 MMOL/L — LOW (ref 3.5–5.3)
POTASSIUM SERPL-SCNC: 3.3 MMOL/L — LOW (ref 3.5–5.3)
PROT SERPL-MCNC: 7.2 G/DL — SIGNIFICANT CHANGE UP (ref 6–8.3)
RBC # BLD: 3.26 M/UL — LOW (ref 3.8–5.2)
RBC # FLD: 17.6 % — HIGH (ref 10.3–14.5)
RBC BLD AUTO: ABNORMAL
SMUDGE CELLS # BLD: PRESENT — SIGNIFICANT CHANGE UP
SODIUM SERPL-SCNC: 138 MMOL/L — SIGNIFICANT CHANGE UP (ref 135–145)
STOMATOCYTES BLD QL SMEAR: SLIGHT — SIGNIFICANT CHANGE UP
WBC # BLD: 5.19 K/UL — SIGNIFICANT CHANGE UP (ref 3.8–10.5)
WBC # FLD AUTO: 5.19 K/UL — SIGNIFICANT CHANGE UP (ref 3.8–10.5)

## 2023-10-03 PROCEDURE — 99233 SBSQ HOSP IP/OBS HIGH 50: CPT | Mod: GC

## 2023-10-03 RX ORDER — SODIUM CHLORIDE 9 MG/ML
1000 INJECTION, SOLUTION INTRAVENOUS
Refills: 0 | Status: DISCONTINUED | OUTPATIENT
Start: 2023-10-03 | End: 2023-10-06

## 2023-10-03 RX ORDER — POTASSIUM CHLORIDE 20 MEQ
40 PACKET (EA) ORAL ONCE
Refills: 0 | Status: COMPLETED | OUTPATIENT
Start: 2023-10-03 | End: 2023-10-03

## 2023-10-03 RX ORDER — MORPHINE SULFATE 50 MG/1
2 CAPSULE, EXTENDED RELEASE ORAL
Refills: 0 | Status: DISCONTINUED | OUTPATIENT
Start: 2023-10-03 | End: 2023-10-04

## 2023-10-03 RX ADMIN — Medication 650 MILLIGRAM(S): at 01:33

## 2023-10-03 RX ADMIN — SODIUM CHLORIDE 150 MILLILITER(S): 9 INJECTION, SOLUTION INTRAVENOUS at 15:48

## 2023-10-03 RX ADMIN — Medication 105 MILLIGRAM(S): at 05:36

## 2023-10-03 RX ADMIN — Medication 40 MILLIEQUIVALENT(S): at 08:51

## 2023-10-03 RX ADMIN — Medication 1 TABLET(S): at 11:15

## 2023-10-03 RX ADMIN — Medication 650 MILLIGRAM(S): at 20:35

## 2023-10-03 RX ADMIN — MORPHINE SULFATE 2 MILLIGRAM(S): 50 CAPSULE, EXTENDED RELEASE ORAL at 16:50

## 2023-10-03 RX ADMIN — HYDROMORPHONE HYDROCHLORIDE 2 MILLIGRAM(S): 2 INJECTION INTRAMUSCULAR; INTRAVENOUS; SUBCUTANEOUS at 06:42

## 2023-10-03 RX ADMIN — Medication 1 MILLIGRAM(S): at 11:15

## 2023-10-03 RX ADMIN — Medication 105 MILLIGRAM(S): at 13:36

## 2023-10-03 RX ADMIN — HYDROMORPHONE HYDROCHLORIDE 2 MILLIGRAM(S): 2 INJECTION INTRAMUSCULAR; INTRAVENOUS; SUBCUTANEOUS at 05:34

## 2023-10-03 RX ADMIN — HYDROMORPHONE HYDROCHLORIDE 2 MILLIGRAM(S): 2 INJECTION INTRAMUSCULAR; INTRAVENOUS; SUBCUTANEOUS at 00:01

## 2023-10-03 RX ADMIN — Medication 650 MILLIGRAM(S): at 00:05

## 2023-10-03 RX ADMIN — MORPHINE SULFATE 2 MILLIGRAM(S): 50 CAPSULE, EXTENDED RELEASE ORAL at 13:43

## 2023-10-03 RX ADMIN — Medication 105 MILLIGRAM(S): at 22:38

## 2023-10-03 RX ADMIN — HYDROMORPHONE HYDROCHLORIDE 2 MILLIGRAM(S): 2 INJECTION INTRAMUSCULAR; INTRAVENOUS; SUBCUTANEOUS at 09:43

## 2023-10-03 RX ADMIN — Medication 650 MILLIGRAM(S): at 21:35

## 2023-10-03 RX ADMIN — Medication 650 MILLIGRAM(S): at 11:18

## 2023-10-03 RX ADMIN — MORPHINE SULFATE 2 MILLIGRAM(S): 50 CAPSULE, EXTENDED RELEASE ORAL at 23:06

## 2023-10-03 NOTE — SBIRT NOTE ADULT - NSSBIRTINJURYRES_GEN_A_CORE
patient informed she fell out of a window while intoxicated when she was 20/Yes, but not in the last year

## 2023-10-03 NOTE — PROGRESS NOTE ADULT - ATTENDING COMMENTS
Patient seen and examined 10/3/23 around 2.30 PM    33 year old female from home with medical history of alcohol use disorder and necrotizing pancreatitis in 2017 presenting to ED for abdominal pain admitted to ICU for acute pancreatitis and hyperkalemia.    Patient downgraded to medical floor. Diet was advanced to regular but not taking anything oral except some water. Abdominal pain improved. No fever or cough. Admits to drinking heavily up to 4 to 5 days a week. Recently also used Cocaine. intermittently also admits using Marijuana.     Vitals: Reviewed as above; mildly elevated DBP otherwise stable    P/E:  Young female, comfortable in bed, NAD  Psych: AAO x3  Neuro: No gross focal deficits; Power and sensation intact  CVS: S1S2 present, regular, no edema  Resp: BLAE+, No wheeze or Rhonchi  GI: Soft, BS+, Mildly tender, non distended  Extr: No  calf tenderness B/L Lower extremities  Skin: Warm and moist without any rashes    Labs:             10.6   5.19  )-----------( 132      ( 03 Oct 2023 06:13 )             32.0     10-03  138  |  102  |  3<L>  ----------------------------<  154<H>  3.3<L>   |  29  |  0.51  Ca    8.2<L>      03 Oct 2023 06:13  Phos  3.1     10-03  Mg     1.9     10-03  TPro  7.2  /  Alb  3.1<L>  /  TBili  0.6  /  DBili  x   /  AST  58<H>  /  ALT  46  /  AlkPhos  92  10-03    CT Abdomen and Pelvis No Cont (10.01.23 @ 01:28)     IMPRESSION: Motion degraded examination.  No evidence of acute traumatic injury in the chest, abdomen and pelvis.  No visualized rib fracture, within limits of motion artifact.  Trace edema versus motion artifact around the pancreatic head.  Acute interstitial pancreatitis should be excluded based on evolution of   patient's symptoms and laboratory values.  Follow-up CT or MRI pancreas protocol may be obtained as clinically warranted.  Diffuse hepatic steatosis.  Distended gallbladder without CT evidence of cholelithiasis or acute cholecystitis.  Right upper quadrant ultrasound   may be obtained for further evaluation.  The uterine fundus appears enlarged; underlying fibroids or adenomyosis is not excluded.  Suspected 1.5 cm dominant follicle in the right ovary.    Pelvic ultrasound may be obtained as clinically warranted.    D/D:  Acute Hyperkalemia resolved  Suspected Acute Pancreatitis  Possible Patient seen and examined 10/3/23 around 2.30 PM    33 year old female from home with medical history of alcohol use disorder and necrotizing pancreatitis in 2017 presenting to ED for abdominal pain admitted to ICU for acute pancreatitis and hyperkalemia.    Patient downgraded to medical floor. Diet was advanced to regular but not taking anything oral except some water. Abdominal pain improved. No fever or cough. Admits to drinking heavily up to 4 to 5 days a week. Recently also used Cocaine. intermittently also admits using Marijuana.     Vitals: Reviewed as above; mildly elevated DBP otherwise stable    P/E:  Young female, comfortable in bed, NAD  Psych: AAO x3  Neuro: No gross focal deficits; Power and sensation intact  CVS: S1S2 present, regular, no edema  Resp: BLAE+, No wheeze or Rhonchi  GI: Soft, BS+, Mildly tender, non distended  Extr: No  calf tenderness B/L Lower extremities  Skin: Warm and moist without any rashes    Labs:             10.6   5.19  )-----------( 132      ( 03 Oct 2023 06:13 )             32.0     10-03  138  |  102  |  3<L>  ----------------------------<  154<H>  3.3<L>   |  29  |  0.51  Ca    8.2<L>      03 Oct 2023 06:13  Phos  3.1     10-03  Mg     1.9     10-03  TPro  7.2  /  Alb  3.1<L>  /  TBili  0.6  /  DBili  x   /  AST  58<H>  /  ALT  46  /  AlkPhos  92  10-03    Lipid Profile (05.28.13 @ 07:33)   Cholesterol, Serum: 106 mg/dL  Triglycerides, Serum: 91 mg/dL  HDL Cholesterol, Serum: 18 mg/dL  Direct LDL: 28 mg/dL    CT Abdomen and Pelvis No Cont (10.01.23 @ 01:28)     IMPRESSION: Motion degraded examination.  No evidence of acute traumatic injury in the chest, abdomen and pelvis.  No visualized rib fracture, within limits of motion artifact.  Trace edema versus motion artifact around the pancreatic head.  Acute interstitial pancreatitis should be excluded based on evolution of   patient's symptoms and laboratory values.  Follow-up CT or MRI pancreas protocol may be obtained as clinically warranted.  Diffuse hepatic steatosis.  Distended gallbladder without CT evidence of cholelithiasis or acute cholecystitis.  Right upper quadrant ultrasound   may be obtained for further evaluation.  The uterine fundus appears enlarged; underlying fibroids or adenomyosis is not excluded.  Suspected 1.5 cm dominant follicle in the right ovary.    Pelvic ultrasound may be obtained as clinically warranted.    D/D:  Suspected Acute Pancreatitis from chronic alcohol use  Severe high anion gap metabolic acidosis likely from combination of alcoholic and starvation ketoacidosis.  Atypical chest pain on admission likely Cocaine use related resolved  Acute Hyperkalemia resolved  Substance use disorder  DANUTA, likely prerenal due to dehydration and por oral intake resolved  Transaminitis likely from alcohol abuse chronic  Hypovolemic hyponatremia resolved  Leukocytosis likely reactive form dehydration    Plan:  Will revert to Full liquid diet and resume IV Fluid hydration; once tolerated advance to regular low fat diet  Get a RUQ Sonogram   Triglycerides WNL  CIWA protocol, Thiamine, MVI and Folic Acid  Alcohol and substance use cessation counseled   consult  If abdominal pain persist will get CT with IV contrast to visualize pancreas better vs MRI   Rest as per PGY1 above    [facility]  Discussed with PGY1 DR. Antonio and PGY3 Dr. Quintero

## 2023-10-03 NOTE — SBIRT NOTE ADULT - NSSBIRTUNABLESCR_GEN_A_CORE
SWer attempted to meet with patient 2x in which patient declined to participate in SW assessment and SBIRT screen./Patient refused

## 2023-10-03 NOTE — PROGRESS NOTE ADULT - PROBLEM SELECTOR PLAN 4
in the setting ot ETOH and possibly starvation ketoacidosis  Hco3 8: s/p 1 amp of bicarb and 4L NS IVF  s/p bicarbonate drip > HCO3 21  RESOLVED

## 2023-10-03 NOTE — SBIRT NOTE ADULT - NSSBIRTBRIEFINTDET_GEN_A_CORE
Patient denied her alcohol use in her daily living. Per patient, she is a functional alcoholic. Patient declined the need for a substance abuse referral. Patient is amendable to mental health resources. Resources was provided at bedside. Patient was educated on the dangers of excessive alcohol use on her health. Patient expressed she understands.  Patient denied her alcohol use is a problem in her daily living. Per patient, she is a functional alcoholic. Patient declined the need for a substance abuse referral. Patient is amendable to outpatient substance abuse resources. Resources were provided at bedside. Patient was educated on the dangers of excessive alcohol use on her health. Patient expressed she understands

## 2023-10-03 NOTE — PROGRESS NOTE ADULT - PROBLEM SELECTOR PLAN 1
-s/p 4L NS in ED   -advance diet as tolerated  -zofran PRN for n/v   currently patient unable to hold down diet, NPO  patient on standing LR  PRN morphine for pain

## 2023-10-03 NOTE — PROGRESS NOTE ADULT - ASSESSMENT
33 year old female from home with medical history of alcohol use disorder and necrotizing pancreatitis in 2017 presenting to ED for abdominal pain admitted for acute pancreatitis and hyperkalemia.     #Metabolic acidosis   #Acute alcohol withdrawal  #Hyperkalemia  #Acute pancreatitis     _________CNS___________  #Pain  tylenol PRN for mild pain  dilaudid PRN for moderate pain      #Etoh withdrawal  no ativan given overnight    c/w folic acid and thiamine       _________CVS___________  #Sinus tachycardia  -likely in the setting of dehydration, compensatory for metabolic acidosis   - avoid beta blockers   RESOLVED     _________RESP__________    no active issues    ___________GI____________  #Acute pancreatitis   -s/p 4L NS in ED   -advance diet as tolerated  -zofran PRN for n/v       ________ RENAL__________  #Metabolic Acidosis  -in the setting ot ETOH and possibly starvation ketoacidosis  -Hco3 8: s/p 1 amp of bicarb and 4L NS IVF  - s/p bicarbonate drip > HCO3 21  RESOLVED     #Hyperkalemia  (resolved)   K 7.3, s/p hyperkalemic cocktail  RESOLVED     #Electrolyte imbalance   ?refeeding syndrome  HypoK 3.9 > 3.1 > 3.0 >3.9   HypoMg 1.2 > 2.0  Hypophos 0.9 >1.4 > 1.9>1.2  Replete as needed   Improving       #hepatic steatosis  likely in the setting of ETOH use    #Positive Urine tox  -cocaine positive in urine toxicology. avoid beta blockers     _____________GU_____________  CT abdomen showing The uterine fundus appears enlarged; underlying fibroids or adenomyosis   is not excluded.  Suspected 1.5 cm dominant follicle in the right ovary.    -OBGYN followup outpatient  -currently on her period       __________MSK___________  no active issues     ___________ID____________  no active issues     _________ENDO__________  no active issues     ______HEME/ONC_______  Leukocytosis  likely reactive. No signs of infection     _________SKIN____________  no active issues     ________PROPHY_______       ______GOC/DISPO___________  ICU      33 year old female from home with medical history of alcohol use disorder and necrotizing pancreatitis in 2017 presenting to ED for abdominal pain admitted for acute pancreatitis and hyperkalemia.

## 2023-10-03 NOTE — PROGRESS NOTE ADULT - SUBJECTIVE AND OBJECTIVE BOX
PGY-1 Progress Note discussed with attending    PAGER #: [] TILL 5:00 PM  PLEASE CONTACT ON CALL TEAM:  - On Call Team (Please refer to Natasha) FROM 5:00 PM - 8:30PM  - Nightfloat Team FROM 8:30 -7:30 AM    CHIEF COMPLAINT & BRIEF HOSPITAL COURSE:    INTERVAL HPI/OVERNIGHT EVENTS:   No acute overnight events. On bedside exam patient was in acute distress due to pain. Patient stated she was not feeling well.    REVIEW OF SYSTEMS:  CONSTITUTIONAL: No fever, weight loss, or fatigue  RESPIRATORY: No cough, wheezing, chills or hemoptysis; No shortness of breath  CARDIOVASCULAR: Chest pain  GASTROINTESTINAL: Severe abdominal pain, not able to hold food down, Pain from throat to stomach when drinking/eating  GENITOURINARY: No dysuria or hematuria, urinary frequency  NEUROLOGICAL: No headaches, memory loss, loss of strength, numbness, or tremors  SKIN: No itching, burning, rashes, or lesions     Vital Signs Last 24 Hrs  T(C): 36.8 (03 Oct 2023 05:17), Max: 37.5 (02 Oct 2023 21:12)  T(F): 98.2 (03 Oct 2023 05:17), Max: 99.5 (02 Oct 2023 21:12)  HR: 88 (03 Oct 2023 05:17) (73 - 92)  BP: 138/101 (03 Oct 2023 05:17) (119/89 - 138/101)  BP(mean): 96 (02 Oct 2023 19:00) (96 - 123)  RR: 18 (03 Oct 2023 05:17) (11 - 21)  SpO2: 100% (03 Oct 2023 05:17) (99% - 100%)    Parameters below as of 03 Oct 2023 05:17  Patient On (Oxygen Delivery Method): room air        PHYSICAL EXAMINATION:  GENERAL: NAD  HEAD:  Atraumatic, Normocephalic  CHEST/LUNG: Clear to auscultation. No rales, rhonchi, wheezing, or rubs  HEART: Regular rate and rhythm; No murmurs, rubs, or gallops  ABDOMEN: Diffusely tender to light palpation, soft, non distended abdomen  NERVOUS SYSTEM:  Alert & Oriented X3,    EXTREMITIES:  2+ Peripheral Pulses, No clubbing, cyanosis, or edema  SKIN: warm dry                          10.6   5.19  )-----------( 132      ( 03 Oct 2023 06:13 )             32.0     10-03    138  |  102  |  3<L>  ----------------------------<  154<H>  3.3<L>   |  29  |  0.51    Ca    8.2<L>      03 Oct 2023 06:13  Phos  3.1     10-03  Mg     1.9     10-03    TPro  7.2  /  Alb  3.1<L>  /  TBili  0.6  /  DBili  x   /  AST  58<H>  /  ALT  46  /  AlkPhos  92  10-03    LIVER FUNCTIONS - ( 03 Oct 2023 06:13 )  Alb: 3.1 g/dL / Pro: 7.2 g/dL / ALK PHOS: 92 U/L / ALT: 46 U/L DA / AST: 58 U/L / GGT: x                   CAPILLARY BLOOD GLUCOSE      RADIOLOGY & ADDITIONAL TESTS:

## 2023-10-03 NOTE — PROGRESS NOTE ADULT - PROBLEM SELECTOR PLAN 3
K 7.3, s/p hyperkalemic cocktail  RESOLVED   likely j refeeding syndrome  HypoK 3.9 > 3.1 > 3.0 >3.9   HypoMg 1.2 > 2.0  Hypophos 0.9 >1.4 > 1.9>1.2  Replete as needed   Improving

## 2023-10-04 DIAGNOSIS — F32.9 MAJOR DEPRESSIVE DISORDER, SINGLE EPISODE, UNSPECIFIED: ICD-10-CM

## 2023-10-04 LAB
ALBUMIN SERPL ELPH-MCNC: 2.9 G/DL — LOW (ref 3.5–5)
ALP SERPL-CCNC: 73 U/L — SIGNIFICANT CHANGE UP (ref 40–120)
ALT FLD-CCNC: 37 U/L DA — SIGNIFICANT CHANGE UP (ref 10–60)
ANION GAP SERPL CALC-SCNC: 8 MMOL/L — SIGNIFICANT CHANGE UP (ref 5–17)
AST SERPL-CCNC: 40 U/L — SIGNIFICANT CHANGE UP (ref 10–40)
BASOPHILS # BLD AUTO: 0.02 K/UL — SIGNIFICANT CHANGE UP (ref 0–0.2)
BASOPHILS NFR BLD AUTO: 0.5 % — SIGNIFICANT CHANGE UP (ref 0–2)
BILIRUB SERPL-MCNC: 0.4 MG/DL — SIGNIFICANT CHANGE UP (ref 0.2–1.2)
BUN SERPL-MCNC: 4 MG/DL — LOW (ref 7–18)
CALCIUM SERPL-MCNC: 9.4 MG/DL — SIGNIFICANT CHANGE UP (ref 8.4–10.5)
CHLORIDE SERPL-SCNC: 104 MMOL/L — SIGNIFICANT CHANGE UP (ref 96–108)
CO2 SERPL-SCNC: 26 MMOL/L — SIGNIFICANT CHANGE UP (ref 22–31)
CREAT SERPL-MCNC: 0.4 MG/DL — LOW (ref 0.5–1.3)
EGFR: 133 ML/MIN/1.73M2 — SIGNIFICANT CHANGE UP
EOSINOPHIL # BLD AUTO: 0.15 K/UL — SIGNIFICANT CHANGE UP (ref 0–0.5)
EOSINOPHIL NFR BLD AUTO: 3.7 % — SIGNIFICANT CHANGE UP (ref 0–6)
GLUCOSE SERPL-MCNC: 112 MG/DL — HIGH (ref 70–99)
HCT VFR BLD CALC: 30.5 % — LOW (ref 34.5–45)
HGB BLD-MCNC: 9.9 G/DL — LOW (ref 11.5–15.5)
IMM GRANULOCYTES NFR BLD AUTO: 0.5 % — SIGNIFICANT CHANGE UP (ref 0–0.9)
LYMPHOCYTES # BLD AUTO: 1.5 K/UL — SIGNIFICANT CHANGE UP (ref 1–3.3)
LYMPHOCYTES # BLD AUTO: 37.1 % — SIGNIFICANT CHANGE UP (ref 13–44)
MAGNESIUM SERPL-MCNC: 2 MG/DL — SIGNIFICANT CHANGE UP (ref 1.6–2.6)
MCHC RBC-ENTMCNC: 32.5 GM/DL — SIGNIFICANT CHANGE UP (ref 32–36)
MCHC RBC-ENTMCNC: 32.8 PG — SIGNIFICANT CHANGE UP (ref 27–34)
MCV RBC AUTO: 101 FL — HIGH (ref 80–100)
MONOCYTES # BLD AUTO: 0.7 K/UL — SIGNIFICANT CHANGE UP (ref 0–0.9)
MONOCYTES NFR BLD AUTO: 17.3 % — HIGH (ref 2–14)
NEUTROPHILS # BLD AUTO: 1.65 K/UL — LOW (ref 1.8–7.4)
NEUTROPHILS NFR BLD AUTO: 40.9 % — LOW (ref 43–77)
NRBC # BLD: 0 /100 WBCS — SIGNIFICANT CHANGE UP (ref 0–0)
PHOSPHATE SERPL-MCNC: 3.3 MG/DL — SIGNIFICANT CHANGE UP (ref 2.5–4.5)
PLATELET # BLD AUTO: 149 K/UL — LOW (ref 150–400)
POTASSIUM SERPL-MCNC: 3.5 MMOL/L — SIGNIFICANT CHANGE UP (ref 3.5–5.3)
POTASSIUM SERPL-SCNC: 3.5 MMOL/L — SIGNIFICANT CHANGE UP (ref 3.5–5.3)
PROT SERPL-MCNC: 6.5 G/DL — SIGNIFICANT CHANGE UP (ref 6–8.3)
RBC # BLD: 3.02 M/UL — LOW (ref 3.8–5.2)
RBC # FLD: 17.4 % — HIGH (ref 10.3–14.5)
SODIUM SERPL-SCNC: 138 MMOL/L — SIGNIFICANT CHANGE UP (ref 135–145)
WBC # BLD: 4.04 K/UL — SIGNIFICANT CHANGE UP (ref 3.8–10.5)
WBC # FLD AUTO: 4.04 K/UL — SIGNIFICANT CHANGE UP (ref 3.8–10.5)

## 2023-10-04 PROCEDURE — 90792 PSYCH DIAG EVAL W/MED SRVCS: CPT

## 2023-10-04 PROCEDURE — 76705 ECHO EXAM OF ABDOMEN: CPT | Mod: 26

## 2023-10-04 PROCEDURE — 99233 SBSQ HOSP IP/OBS HIGH 50: CPT | Mod: GC

## 2023-10-04 RX ORDER — NALTREXONE HYDROCHLORIDE 50 MG/1
50 TABLET, FILM COATED ORAL DAILY
Refills: 0 | Status: DISCONTINUED | OUTPATIENT
Start: 2023-10-04 | End: 2023-10-05

## 2023-10-04 RX ORDER — PANTOPRAZOLE SODIUM 20 MG/1
40 TABLET, DELAYED RELEASE ORAL
Refills: 0 | Status: DISCONTINUED | OUTPATIENT
Start: 2023-10-04 | End: 2023-10-05

## 2023-10-04 RX ORDER — THIAMINE MONONITRATE (VIT B1) 100 MG
100 TABLET ORAL DAILY
Refills: 0 | Status: DISCONTINUED | OUTPATIENT
Start: 2023-10-04 | End: 2023-10-06

## 2023-10-04 RX ORDER — KETOROLAC TROMETHAMINE 30 MG/ML
15 SYRINGE (ML) INJECTION EVERY 6 HOURS
Refills: 0 | Status: DISCONTINUED | OUTPATIENT
Start: 2023-10-04 | End: 2023-10-06

## 2023-10-04 RX ORDER — KETOROLAC TROMETHAMINE 30 MG/ML
15 SYRINGE (ML) INJECTION EVERY 6 HOURS
Refills: 0 | Status: DISCONTINUED | OUTPATIENT
Start: 2023-10-04 | End: 2023-10-04

## 2023-10-04 RX ORDER — SERTRALINE 25 MG/1
50 TABLET, FILM COATED ORAL DAILY
Refills: 0 | Status: DISCONTINUED | OUTPATIENT
Start: 2023-10-04 | End: 2023-10-05

## 2023-10-04 RX ADMIN — MORPHINE SULFATE 2 MILLIGRAM(S): 50 CAPSULE, EXTENDED RELEASE ORAL at 06:03

## 2023-10-04 RX ADMIN — MORPHINE SULFATE 2 MILLIGRAM(S): 50 CAPSULE, EXTENDED RELEASE ORAL at 13:00

## 2023-10-04 RX ADMIN — Medication 650 MILLIGRAM(S): at 04:33

## 2023-10-04 RX ADMIN — SODIUM CHLORIDE 150 MILLILITER(S): 9 INJECTION, SOLUTION INTRAVENOUS at 01:06

## 2023-10-04 RX ADMIN — Medication 1 MILLIGRAM(S): at 12:24

## 2023-10-04 RX ADMIN — NALTREXONE HYDROCHLORIDE 50 MILLIGRAM(S): 50 TABLET, FILM COATED ORAL at 14:36

## 2023-10-04 RX ADMIN — Medication 105 MILLIGRAM(S): at 05:57

## 2023-10-04 RX ADMIN — PANTOPRAZOLE SODIUM 40 MILLIGRAM(S): 20 TABLET, DELAYED RELEASE ORAL at 14:17

## 2023-10-04 RX ADMIN — MORPHINE SULFATE 2 MILLIGRAM(S): 50 CAPSULE, EXTENDED RELEASE ORAL at 12:27

## 2023-10-04 RX ADMIN — Medication 1 TABLET(S): at 12:24

## 2023-10-04 RX ADMIN — Medication 100 MILLIGRAM(S): at 12:24

## 2023-10-04 RX ADMIN — SERTRALINE 50 MILLIGRAM(S): 25 TABLET, FILM COATED ORAL at 14:36

## 2023-10-04 RX ADMIN — SODIUM CHLORIDE 150 MILLILITER(S): 9 INJECTION, SOLUTION INTRAVENOUS at 12:25

## 2023-10-04 RX ADMIN — MORPHINE SULFATE 2 MILLIGRAM(S): 50 CAPSULE, EXTENDED RELEASE ORAL at 00:10

## 2023-10-04 NOTE — PROGRESS NOTE ADULT - PROBLEM SELECTOR PLAN 3
K 7.3, s/p hyperkalemic cocktail  RESOLVED   likely j refeeding syndrome  HypoK 3.9 > 3.1 > 3.0 >3.9   HypoMg 1.2 > 2.0  Hypophos 0.9 >1.4 > 1.9>1.2  Replete as needed   Improving Started on Sertraline

## 2023-10-04 NOTE — PROGRESS NOTE ADULT - ATTENDING COMMENTS
Patient seen and examined earlier this afternoon    33 year old female from home with medical history of alcohol use disorder and necrotizing pancreatitis in 2017 presenting to ED for abdominal pain admitted to ICU for acute pancreatitis and hyperkalemia.    Patient downgraded to medical floor. Diet was advanced to regular but not taking anything oral except some water. Abdominal pain improved. No fever or cough. Admits to drinking heavily up to 4 to 5 days a week. Recently also used Cocaine. intermittently also admits using Marijuana. Lives with her 16 y/o Son.   Patient doing better although did not wanted to drink soup and only liquids this morning. Pain is controlled intermittently worse as per patient.     Patient admits to being depressed with periods of anxiety and to relieve her anxiety she takes shots of vodka; She has not seeken any help for her anxiety symptoms despite her awareness and acceptance of the problem. She is open to taking medications if needed as well as talking with a Behavioral specialist    Vital Signs Last 24 Hrs  T(C): 37.3 (04 Oct 2023 13:34), Max: 37.4 (03 Oct 2023 21:23)  T(F): 99.1 (04 Oct 2023 13:34), Max: 99.3 (03 Oct 2023 21:23)  HR: 97 (04 Oct 2023 13:34) (90 - 97)  BP: 128/92 (04 Oct 2023 13:34) (126/85 - 128/92)  RR: 18 (04 Oct 2023 13:34) (18 - 18)  SpO2: 100% (04 Oct 2023 13:34) (100% - 100%) room air    P/E:  Young female, comfortable in bed, NAD  Psych: AAO x3  Neuro: No gross focal deficits; Power and sensation intact  CVS: S1S2 present, regular, no edema  Resp: BLAE+, No wheeze or Rhonchi  GI: Soft, BS+, Mildly tender, non distended  Extr: No  calf tenderness B/L Lower extremities  Skin: Warm and moist without any rashes    Labs:                      9.9    4.04  )-----------( 149      ( 04 Oct 2023 05:55 )             30.5   10-04  138  |  104  |  4<L>  ----------------------------<  112<H>  3.5   |  26  |  0.40<L>  Ca    9.4      04 Oct 2023 05:55  Phos  3.3     10-04  Mg     2.0     10-04  TPro  6.5  /  Alb  2.9<L>  /  TBili  0.4  /  DBili  x   /  AST  40  /  ALT  37  /  AlkPhos  73  10-04    US Abdomen Upper Quadrant Right (10.04.23 @ 10:22)     FINDINGS:  Liver: Steatosis.  Bile ducts: Normal caliber. Common bile duct measures 3 mm.  Gallbladder: Within normal limits. No stones or gallbladder wall thickening. No sonographic Shabazz's sign.  Pancreas: Visualized portions are within normal limits.  Right kidney: 9.4 cm. No hydronephrosis.  Ascites: None.  IVC: Visualized portions are within normal limits.    IMPRESSION: No cholelithiasis or acute cholecystitis.      D/D:  Major Depression with Anxiety state   Suspected Acute Pancreatitis from chronic alcohol use  Severe high anion gap metabolic acidosis likely from combination of alcoholic and starvation ketoacidosis. resolved  Atypical chest pain on admission likely Cocaine use related resolved  Acute Hyperkalemia resolved  Substance use disorder  DANUTA, likely prerenal due to dehydration and por oral intake resolved  Transaminitis likely from alcohol abuse chronic  Hypovolemic hyponatremia resolved  Leukocytosis likely reactive form dehydration    Plan:  After extensive emotional counseling provided and patient offered a Behavioral specialist consult, patient was more open to discuss her underlying mental health issues; Psych Consult appreciated; d/w Dr. Izaguirre recommended Zoloft 50 mg daily plus Naltrexone 50 mg daily   Patient understands that these medications takes a week to 10 days to work  Patient also open to  follow up and referral to outpatient Substance use Rehab and Behavioral Health referral; d/w MAUREEN Mallory  RU Sonogram negative for any Liver or Gall Bladder pathology;   Patient wishes to try solid food this afternoon as she was able to keep Soup down;   If remain stable and tolerate diet  D/C Home tomorrow  D/C CIWA no withdrawal symptoms; may continue Thiamine and Folate orally  Alcohol and substance use cessation counseled  Rest as per PGY1 above    Discussed with Patient at length  Discussed with PGY1 Dr. Antonio and PGY3 Dr. Quintero

## 2023-10-04 NOTE — BH CONSULTATION LIAISON ASSESSMENT NOTE - HPI (INCLUDE ILLNESS QUALITY, SEVERITY, DURATION, TIMING, CONTEXT, MODIFYING FACTORS, ASSOCIATED SIGNS AND SYMPTOMS)
32 y/o F with a hx of AUD, and a hx of necrotizing pancreatitis, who is admitted due to acute pancreatitis and metabolic acidosis. She is consulted due to depression, and anxiety. Patient reports that she suffers from increased anxiety including difficulty going out of her house, meeting people and going to open spaces. Says that she deals with her fears and anxiety by drinking alcohol. Reports that whenever she feels anxious or knows that she will do something that might cause anxiety, she has a shot of vodka. Reports that she ends up consuming various shots during the day, almost 6 days of the week. Says that she usually drinks vodka, but sometimes also beer. Acknowledges that very once in a while, she uses cocaine as well. Also report depression and intermittent episodes of hopelessness, worthlessness and guilt. Says that she has sometimes suffered from death wishes, but emphatically denies any SI. Reports difficulty sleeping, unless she drinks alcohol and says that her appetite has decreased too. Denies any HI or AVH. Is willing to try medication that would help her with anxiety and depression, as well as any treatments for her alcohol use.

## 2023-10-04 NOTE — BH CONSULTATION LIAISON ASSESSMENT NOTE - NSBHCONSULTFOLLOWAFTERCARE_PSY_A_CORE FT
[facility]  [address] [place], [place] [id number] [phone]    [facility]  [address] [place] [phone]

## 2023-10-04 NOTE — PROGRESS NOTE ADULT - PROBLEM SELECTOR PLAN 4
in the setting ot ETOH and possibly starvation ketoacidosis  Hco3 8: s/p 1 amp of bicarb and 4L NS IVF  s/p bicarbonate drip > HCO3 21  RESOLVED K 7.3, s/p hyperkalemic cocktail  RESOLVED   likely j refeeding syndrome  HypoK 3.9 > 3.1 > 3.0 >3.9   HypoMg 1.2 > 2.0  Hypophos 0.9 >1.4 > 1.9>1.2  Replete as needed   Improving

## 2023-10-04 NOTE — PROGRESS NOTE ADULT - SUBJECTIVE AND OBJECTIVE BOX
PGY-1 Progress Note discussed with attending    PAGER #: [] TILL 5:00 PM  PLEASE CONTACT ON CALL TEAM:  - On Call Team (Please refer to Natasha) FROM 5:00 PM - 8:30PM  - Nightfloat Team FROM 8:30 -7:30 AM    CHIEF COMPLAINT & BRIEF HOSPITAL COURSE:    INTERVAL HPI/OVERNIGHT EVENTS:       REVIEW OF SYSTEMS:  CONSTITUTIONAL: No fever, weight loss, or fatigue  RESPIRATORY: No cough, wheezing, chills or hemoptysis; No shortness of breath  CARDIOVASCULAR: No chest pain, palpitations, dizziness, or leg swelling  GASTROINTESTINAL: No abdominal pain. No nausea, vomiting, or hematemesis; No diarrhea or constipation. No melena or hematochezia.  GENITOURINARY: No dysuria or hematuria, urinary frequency  NEUROLOGICAL: No headaches, memory loss, loss of strength, numbness, or tremors  SKIN: No itching, burning, rashes, or lesions     Vital Signs Last 24 Hrs  T(C): 36.6 (04 Oct 2023 05:08), Max: 37.4 (03 Oct 2023 21:23)  T(F): 97.9 (04 Oct 2023 05:08), Max: 99.3 (03 Oct 2023 21:23)  HR: 94 (04 Oct 2023 05:08) (90 - 95)  BP: 126/85 (04 Oct 2023 05:08) (111/81 - 126/86)  BP(mean): --  RR: 18 (04 Oct 2023 05:08) (18 - 18)  SpO2: 100% (04 Oct 2023 05:08) (98% - 100%)    Parameters below as of 04 Oct 2023 05:08  Patient On (Oxygen Delivery Method): room air        PHYSICAL EXAMINATION:  GENERAL: NAD  HEAD:  Atraumatic, Normocephalic  EYES:  conjunctiva and sclera clear  NECK: Supple, No JVD, Normal thyroid  CHEST/LUNG: Clear to auscultation. Clear to percussion bilaterally; No rales, rhonchi, wheezing, or rubs  HEART: Regular rate and rhythm; No murmurs, rubs, or gallops  ABDOMEN: Soft, Nontender, Nondistended; Bowel sounds present  NERVOUS SYSTEM:  Alert & Oriented X3,    EXTREMITIES:  2+ Peripheral Pulses, No clubbing, cyanosis, or edema  SKIN: warm dry                          9.9    4.04  )-----------( 149      ( 04 Oct 2023 05:55 )             30.5     10-04    138  |  104  |  4<L>  ----------------------------<  112<H>  3.5   |  26  |  0.40<L>    Ca    9.4      04 Oct 2023 05:55  Phos  3.3     10-04  Mg     2.0     10-04    TPro  6.5  /  Alb  2.9<L>  /  TBili  0.4  /  DBili  x   /  AST  40  /  ALT  37  /  AlkPhos  73  10-04    LIVER FUNCTIONS - ( 04 Oct 2023 05:55 )  Alb: 2.9 g/dL / Pro: 6.5 g/dL / ALK PHOS: 73 U/L / ALT: 37 U/L DA / AST: 40 U/L / GGT: x                   CAPILLARY BLOOD GLUCOSE      RADIOLOGY & ADDITIONAL TESTS:                   PGY-1 Progress Note discussed with attending    PAGER #: [] TILL 5:00 PM  PLEASE CONTACT ON CALL TEAM:  - On Call Team (Please refer to Natasha) FROM 5:00 PM - 8:30PM  - Nightfloat Team FROM 8:30 -7:30 AM    CHIEF COMPLAINT & BRIEF HOSPITAL COURSE:    INTERVAL HPI/OVERNIGHT EVENTS: Patient reports uncontrolled pain last night. On bedside exam patient stated she was not feeling well.      REVIEW OF SYSTEMS:  CONSTITUTIONAL: No fever, weight loss, or fatigue  RESPIRATORY: No cough, wheezing, chills or hemoptysis; No shortness of breath  CARDIOVASCULAR: No chest pain, palpitations, dizziness, or leg swelling  GASTROINTESTINAL: Patient reports not being able to hold down food.  GENITOURINARY: No dysuria or hematuria, urinary frequency  NEUROLOGICAL: No headaches, memory loss, loss of strength, numbness, or tremors  SKIN: No itching, burning, rashes, or lesions     Vital Signs Last 24 Hrs  T(C): 36.6 (04 Oct 2023 05:08), Max: 37.4 (03 Oct 2023 21:23)  T(F): 97.9 (04 Oct 2023 05:08), Max: 99.3 (03 Oct 2023 21:23)  HR: 94 (04 Oct 2023 05:08) (90 - 95)  BP: 126/85 (04 Oct 2023 05:08) (111/81 - 126/86)  BP(mean): --  RR: 18 (04 Oct 2023 05:08) (18 - 18)  SpO2: 100% (04 Oct 2023 05:08) (98% - 100%)    Parameters below as of 04 Oct 2023 05:08  Patient On (Oxygen Delivery Method): room air        PHYSICAL EXAMINATION:  GENERAL: NAD  HEAD:  Atraumatic, Normocephalic  CHEST/LUNG: Clear to auscultation. No rales, rhonchi, wheezing, or rubs  HEART: Regular rate and rhythm; No murmurs, rubs, or gallops  ABDOMEN: Soft, slightly tender RUQ and epigastric region.  NERVOUS SYSTEM:  Alert & Oriented X3,    EXTREMITIES:  2+ Peripheral Pulses, No clubbing, cyanosis, or edema  SKIN: warm dry                          9.9    4.04  )-----------( 149      ( 04 Oct 2023 05:55 )             30.5     10-04    138  |  104  |  4<L>  ----------------------------<  112<H>  3.5   |  26  |  0.40<L>    Ca    9.4      04 Oct 2023 05:55  Phos  3.3     10-04  Mg     2.0     10-04    TPro  6.5  /  Alb  2.9<L>  /  TBili  0.4  /  DBili  x   /  AST  40  /  ALT  37  /  AlkPhos  73  10-04    LIVER FUNCTIONS - ( 04 Oct 2023 05:55 )  Alb: 2.9 g/dL / Pro: 6.5 g/dL / ALK PHOS: 73 U/L / ALT: 37 U/L DA / AST: 40 U/L / GGT: x                   CAPILLARY BLOOD GLUCOSE      RADIOLOGY & ADDITIONAL TESTS:

## 2023-10-04 NOTE — BH CONSULTATION LIAISON ASSESSMENT NOTE - NSBHCHARTREVIEWVS_PSY_A_CORE FT
Vital Signs Last 24 Hrs  T(C): 37.3 (04 Oct 2023 13:34), Max: 37.4 (03 Oct 2023 21:23)  T(F): 99.1 (04 Oct 2023 13:34), Max: 99.3 (03 Oct 2023 21:23)  HR: 97 (04 Oct 2023 13:34) (90 - 97)  BP: 128/92 (04 Oct 2023 13:34) (126/85 - 128/92)  BP(mean): --  RR: 18 (04 Oct 2023 13:34) (18 - 18)  SpO2: 100% (04 Oct 2023 13:34) (100% - 100%)    Parameters below as of 04 Oct 2023 13:34  Patient On (Oxygen Delivery Method): room air

## 2023-10-04 NOTE — BH CONSULTATION LIAISON ASSESSMENT NOTE - NSICDXBHSECONDARYDX_PSY_ALL_CORE
Pancreatitis   K85.90  ETOH abuse   F10.10  Hyperkalemia   E87.5  Metabolic acidosis   E87.20  Electrolyte imbalance   E87.8  Major depression   F32.9

## 2023-10-04 NOTE — PROGRESS NOTE ADULT - PROBLEM SELECTOR PLAN 1
-s/p 4L NS in ED   -advance diet as tolerated  -zofran PRN for n/v   currently patient unable to hold down diet, NPO  patient on standing LR  PRN morphine for pain -s/p 4L NS in ED   -advance diet as tolerated  -zofran PRN for n/v   currently patient unable to hold down diet,  started on clear liquid diet  patient on standing LR  d/c PRN opiods due to nausea -s/p 4L NS in ED   -advance diet as tolerated  -zofran PRN for n/v   currently patient unable to hold down diet,  started on clear liquid diet  patient on standing LR  d/c PRN opiods due to nausea  Started naltrexone

## 2023-10-04 NOTE — BH CONSULTATION LIAISON ASSESSMENT NOTE - NSBHCHARTREVIEWINVESTIGATE_PSY_A_CORE FT
< from: 12 Lead ECG (10.01.23 @ 21:23) >    Ventricular Rate 115 BPM    Atrial Rate 115 BPM    P-R Interval 154 ms    QRS Duration 72 ms    Q-T Interval 332 ms    QTC Calculation(Bazett) 459 ms    P Axis 62 degrees    R Axis 39 degrees    T Axis 48 degrees    Diagnosis Line Sinus tachycardia  Possible Left atrial enlargement  Borderline ECG    Confirmed by JOHN KELLEY, [facility] ([id number]) on 10/2/2023 8:03:31 AM    < end of copied text >

## 2023-10-04 NOTE — BH CONSULTATION LIAISON ASSESSMENT NOTE - CURRENT MEDICATION
MEDICATIONS  (STANDING):  albuterol    0.083%.. 2.5 milliGRAM(s) Nebulizer every 20 minutes  folic acid 1 milliGRAM(s) Oral daily  lactated ringers. 1000 milliLiter(s) (150 mL/Hr) IV Continuous <Continuous>  multivitamin 1 Tablet(s) Oral daily  naltrexone 50 milliGRAM(s) Oral daily  pantoprazole    Tablet 40 milliGRAM(s) Oral before breakfast  sertraline 50 milliGRAM(s) Oral daily  thiamine 100 milliGRAM(s) Oral daily    MEDICATIONS  (PRN):  acetaminophen     Tablet .. 650 milliGRAM(s) Oral every 6 hours PRN Temp greater or equal to 38C (100.4F), Mild Pain (1 - 3)  ketorolac   Injectable 15 milliGRAM(s) IV Push every 6 hours PRN Moderate Pain (4 - 6)  ondansetron Injectable 4 milliGRAM(s) IV Push every 6 hours PRN Nausea and/or Vomiting

## 2023-10-04 NOTE — BH CONSULTATION LIAISON ASSESSMENT NOTE - SUMMARY
32 y/o F with a hx of AUD, and a hx of necrotizing pancreatitis, who is admitted due to acute pancreatitis and metabolic acidosis. She is consulted due to depression, and anxiety. 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 She is amenable to rehab, psychotherapy and psychopharmacological treatments. She is not suicidal, homicidal or psychotic.    -Consider Zoloft 50 mg PO daily  -Would also consider Naltrexone 50 mg PO daily; if without laboratory contraindication to naltrexone (LFTs <3x upper limit of normal; total bilirubin <3, INR <2; b-hcg negative when applicable; urine drug screen negative for opioids (including buprenorphine and methadone)  -Would benefit from either inpatient or outpatient rehab, as well as outpatient psychiatric f/u and AA  -No need for an inpatient psychiatric admission or 1:1  -SW eval  -Case discussed with the primary team  -Please reconsult as needed

## 2023-10-04 NOTE — BH CONSULTATION LIAISON ASSESSMENT NOTE - NSBHCHARTREVIEWLAB_PSY_A_CORE FT
CBC Full  -  ( 04 Oct 2023 05:55 )  WBC Count : 4.04 K/uL  RBC Count : 3.02 M/uL  Hemoglobin : 9.9 g/dL  Hematocrit : 30.5 %  Platelet Count - Automated : 149 K/uL  Mean Cell Volume : 101.0 fl  Mean Cell Hemoglobin : 32.8 pg  Mean Cell Hemoglobin Concentration : 32.5 gm/dL  Auto Neutrophil # : 1.65 K/uL  Auto Lymphocyte # : 1.50 K/uL  Auto Monocyte # : 0.70 K/uL  Auto Eosinophil # : 0.15 K/uL  Auto Basophil # : 0.02 K/uL  Auto Neutrophil % : 40.9 %  Auto Lymphocyte % : 37.1 %  Auto Monocyte % : 17.3 %  Auto Eosinophil % : 3.7 %  Auto Basophil % : 0.5 %  10-04    138  |  104  |  4<L>  ----------------------------<  112<H>  3.5   |  26  |  0.40<L>    Ca    9.4      04 Oct 2023 05:55  Phos  3.3     10-04  Mg     2.0     10-04    TPro  6.5  /  Alb  2.9<L>  /  TBili  0.4  /  DBili  x   /  AST  40  /  ALT  37  /  AlkPhos  73  10-04

## 2023-10-04 NOTE — PROGRESS NOTE ADULT - PROBLEM SELECTOR PLAN 2
Currently CIWA 0  Ativan PRN  c/w folic acid and thiamine community/leisure/home management/self-care

## 2023-10-04 NOTE — PROGRESS NOTE ADULT - ASSESSMENT
33 year old female from home with medical history of alcohol use disorder and necrotizing pancreatitis in 2017 presenting to ED for abdominal pain admitted for acute pancreatitis and hyperkalemia.

## 2023-10-05 DIAGNOSIS — R07.9 CHEST PAIN, UNSPECIFIED: ICD-10-CM

## 2023-10-05 LAB
ALBUMIN SERPL ELPH-MCNC: 3 G/DL — LOW (ref 3.5–5)
ALP SERPL-CCNC: 70 U/L — SIGNIFICANT CHANGE UP (ref 40–120)
ALT FLD-CCNC: 36 U/L DA — SIGNIFICANT CHANGE UP (ref 10–60)
ANION GAP SERPL CALC-SCNC: 12 MMOL/L — SIGNIFICANT CHANGE UP (ref 5–17)
AST SERPL-CCNC: 38 U/L — SIGNIFICANT CHANGE UP (ref 10–40)
BILIRUB SERPL-MCNC: 0.4 MG/DL — SIGNIFICANT CHANGE UP (ref 0.2–1.2)
BUN SERPL-MCNC: 6 MG/DL — LOW (ref 7–18)
CALCIUM SERPL-MCNC: 9 MG/DL — SIGNIFICANT CHANGE UP (ref 8.4–10.5)
CHLORIDE SERPL-SCNC: 102 MMOL/L — SIGNIFICANT CHANGE UP (ref 96–108)
CO2 SERPL-SCNC: 23 MMOL/L — SIGNIFICANT CHANGE UP (ref 22–31)
CREAT SERPL-MCNC: 0.36 MG/DL — LOW (ref 0.5–1.3)
EGFR: 137 ML/MIN/1.73M2 — SIGNIFICANT CHANGE UP
GLUCOSE SERPL-MCNC: 108 MG/DL — HIGH (ref 70–99)
HCT VFR BLD CALC: 29.3 % — LOW (ref 34.5–45)
HGB BLD-MCNC: 9.9 G/DL — LOW (ref 11.5–15.5)
MAGNESIUM SERPL-MCNC: 1.7 MG/DL — SIGNIFICANT CHANGE UP (ref 1.6–2.6)
MCHC RBC-ENTMCNC: 32.9 PG — SIGNIFICANT CHANGE UP (ref 27–34)
MCHC RBC-ENTMCNC: 33.8 GM/DL — SIGNIFICANT CHANGE UP (ref 32–36)
MCV RBC AUTO: 97.3 FL — SIGNIFICANT CHANGE UP (ref 80–100)
NRBC # BLD: 0 /100 WBCS — SIGNIFICANT CHANGE UP (ref 0–0)
PHOSPHATE SERPL-MCNC: 3.1 MG/DL — SIGNIFICANT CHANGE UP (ref 2.5–4.5)
PLATELET # BLD AUTO: 187 K/UL — SIGNIFICANT CHANGE UP (ref 150–400)
POTASSIUM SERPL-MCNC: 3.5 MMOL/L — SIGNIFICANT CHANGE UP (ref 3.5–5.3)
POTASSIUM SERPL-SCNC: 3.5 MMOL/L — SIGNIFICANT CHANGE UP (ref 3.5–5.3)
PROT SERPL-MCNC: 6.8 G/DL — SIGNIFICANT CHANGE UP (ref 6–8.3)
RBC # BLD: 3.01 M/UL — LOW (ref 3.8–5.2)
RBC # FLD: 17.3 % — HIGH (ref 10.3–14.5)
SODIUM SERPL-SCNC: 137 MMOL/L — SIGNIFICANT CHANGE UP (ref 135–145)
WBC # BLD: 6.62 K/UL — SIGNIFICANT CHANGE UP (ref 3.8–10.5)
WBC # FLD AUTO: 6.62 K/UL — SIGNIFICANT CHANGE UP (ref 3.8–10.5)

## 2023-10-05 PROCEDURE — 99232 SBSQ HOSP IP/OBS MODERATE 35: CPT | Mod: GC

## 2023-10-05 RX ORDER — SERTRALINE 25 MG/1
50 TABLET, FILM COATED ORAL AT BEDTIME
Refills: 0 | Status: DISCONTINUED | OUTPATIENT
Start: 2023-10-05 | End: 2023-10-06

## 2023-10-05 RX ORDER — SERTRALINE 25 MG/1
50 TABLET, FILM COATED ORAL ONCE
Refills: 0 | Status: COMPLETED | OUTPATIENT
Start: 2023-10-05 | End: 2023-10-05

## 2023-10-05 RX ORDER — SERTRALINE 25 MG/1
50 TABLET, FILM COATED ORAL DAILY
Refills: 0 | Status: DISCONTINUED | OUTPATIENT
Start: 2023-10-05 | End: 2023-10-05

## 2023-10-05 RX ORDER — PANTOPRAZOLE SODIUM 20 MG/1
40 TABLET, DELAYED RELEASE ORAL
Refills: 0 | Status: DISCONTINUED | OUTPATIENT
Start: 2023-10-05 | End: 2023-10-06

## 2023-10-05 RX ADMIN — Medication 1 TABLET(S): at 13:22

## 2023-10-05 RX ADMIN — Medication 650 MILLIGRAM(S): at 20:35

## 2023-10-05 RX ADMIN — SERTRALINE 50 MILLIGRAM(S): 25 TABLET, FILM COATED ORAL at 16:04

## 2023-10-05 RX ADMIN — Medication 650 MILLIGRAM(S): at 21:41

## 2023-10-05 RX ADMIN — Medication 1 MILLIGRAM(S): at 13:22

## 2023-10-05 RX ADMIN — PANTOPRAZOLE SODIUM 40 MILLIGRAM(S): 20 TABLET, DELAYED RELEASE ORAL at 05:09

## 2023-10-05 RX ADMIN — PANTOPRAZOLE SODIUM 40 MILLIGRAM(S): 20 TABLET, DELAYED RELEASE ORAL at 17:30

## 2023-10-05 RX ADMIN — Medication 100 MILLIGRAM(S): at 13:23

## 2023-10-05 RX ADMIN — Medication 15 MILLIGRAM(S): at 08:53

## 2023-10-05 RX ADMIN — SERTRALINE 50 MILLIGRAM(S): 25 TABLET, FILM COATED ORAL at 21:43

## 2023-10-05 RX ADMIN — Medication 15 MILLIGRAM(S): at 09:53

## 2023-10-05 NOTE — PROGRESS NOTE ADULT - SUBJECTIVE AND OBJECTIVE BOX
PGY-1 Progress Note discussed with attending    PAGER #: [] TILL 5:00 PM  PLEASE CONTACT ON CALL TEAM:  - On Call Team (Please refer to Natasha) FROM 5:00 PM - 8:30PM  - Nightfloat Team FROM 8:30 -7:30 AM    CHIEF COMPLAINT & BRIEF HOSPITAL COURSE:    INTERVAL HPI/OVERNIGHT EVENTS:       REVIEW OF SYSTEMS:  CONSTITUTIONAL: No fever, weight loss, or fatigue  RESPIRATORY: No cough, wheezing, chills or hemoptysis; No shortness of breath  CARDIOVASCULAR: No chest pain, palpitations, dizziness, or leg swelling  GASTROINTESTINAL: No abdominal pain. No nausea, vomiting, or hematemesis; No diarrhea or constipation. No melena or hematochezia.  GENITOURINARY: No dysuria or hematuria, urinary frequency  NEUROLOGICAL: No headaches, memory loss, loss of strength, numbness, or tremors  SKIN: No itching, burning, rashes, or lesions     Vital Signs Last 24 Hrs  T(C): 36.7 (05 Oct 2023 05:02), Max: 37.3 (04 Oct 2023 13:34)  T(F): 98.1 (05 Oct 2023 05:02), Max: 99.1 (04 Oct 2023 13:34)  HR: 102 (05 Oct 2023 05:02) (97 - 102)  BP: 133/90 (05 Oct 2023 05:02) (128/92 - 141/89)  BP(mean): --  RR: 18 (05 Oct 2023 05:02) (18 - 18)  SpO2: 100% (05 Oct 2023 05:02) (100% - 100%)    Parameters below as of 05 Oct 2023 05:02  Patient On (Oxygen Delivery Method): room air        PHYSICAL EXAMINATION:  GENERAL: NAD  HEAD:  Atraumatic, Normocephalic  EYES:  conjunctiva and sclera clear  NECK: Supple, No JVD, Normal thyroid  CHEST/LUNG: Clear to auscultation. Clear to percussion bilaterally; No rales, rhonchi, wheezing, or rubs  HEART: Regular rate and rhythm; No murmurs, rubs, or gallops  ABDOMEN: Soft, Nontender, Nondistended; Bowel sounds present  NERVOUS SYSTEM:  Alert & Oriented X3,    EXTREMITIES:  2+ Peripheral Pulses, No clubbing, cyanosis, or edema  SKIN: warm dry                          9.9    6.62  )-----------( 187      ( 05 Oct 2023 07:26 )             29.3     10-05    137  |  102  |  6<L>  ----------------------------<  108<H>  3.5   |  23  |  0.36<L>    Ca    9.0      05 Oct 2023 07:26  Phos  3.1     10-05  Mg     1.7     10-05    TPro  6.8  /  Alb  3.0<L>  /  TBili  0.4  /  DBili  x   /  AST  38  /  ALT  36  /  AlkPhos  70  10-05    LIVER FUNCTIONS - ( 05 Oct 2023 07:26 )  Alb: 3.0 g/dL / Pro: 6.8 g/dL / ALK PHOS: 70 U/L / ALT: 36 U/L DA / AST: 38 U/L / GGT: x                   CAPILLARY BLOOD GLUCOSE      RADIOLOGY & ADDITIONAL TESTS:                   PGY-1 Progress Note discussed with attending    PAGER #: [] TILL 5:00 PM  PLEASE CONTACT ON CALL TEAM:  - On Call Team (Please refer to Natasha) FROM 5:00 PM - 8:30PM  - Nightfloat Team FROM 8:30 -7:30 AM    CHIEF COMPLAINT & BRIEF HOSPITAL COURSE:    INTERVAL HPI/OVERNIGHT EVENTS: patient complained of nausea, loss of appetite, dizziness and brain fog yesterday night after taking naltrexone and sertraline.  On bedside examination, patient stated she was feeling better now.      REVIEW OF SYSTEMS:  CONSTITUTIONAL: No fever, weight loss, or fatigue  RESPIRATORY: No cough, wheezing, chills or hemoptysis; No shortness of breath  CARDIOVASCULAR: , palpitations, dizziness, or leg swelling. Chest pain, radiating to throat.  GASTROINTESTINAL: No abdominal pain. No nausea, vomiting, or hematemesis; No diarrhea or constipation. No melena or hematochezia.  GENITOURINARY: No dysuria or hematuria, urinary frequency  NEUROLOGICAL: No headaches, memory loss, loss of strength, numbness, or tremors  SKIN: No itching, burning, rashes, or lesions     Vital Signs Last 24 Hrs  T(C): 36.7 (05 Oct 2023 05:02), Max: 37.3 (04 Oct 2023 13:34)  T(F): 98.1 (05 Oct 2023 05:02), Max: 99.1 (04 Oct 2023 13:34)  HR: 102 (05 Oct 2023 05:02) (97 - 102)  BP: 133/90 (05 Oct 2023 05:02) (128/92 - 141/89)  BP(mean): --  RR: 18 (05 Oct 2023 05:02) (18 - 18)  SpO2: 100% (05 Oct 2023 05:02) (100% - 100%)    Parameters below as of 05 Oct 2023 05:02  Patient On (Oxygen Delivery Method): room air        PHYSICAL EXAMINATION:  GENERAL: NAD  HEAD:  Atraumatic, Normocephalic  CHEST/LUNG: Clear to auscultation. No rales, rhonchi, wheezing, or rubs  HEART: Regular rate and rhythm; No murmurs, rubs, or gallops  ABDOMEN: Soft, Nontender, Nondistended; Bowel sounds present  NERVOUS SYSTEM:  Alert & Oriented X3,    EXTREMITIES:  2+ Peripheral Pulses, No clubbing, cyanosis, or edema  SKIN: warm dry                          9.9    6.62  )-----------( 187      ( 05 Oct 2023 07:26 )             29.3     10-05    137  |  102  |  6<L>  ----------------------------<  108<H>  3.5   |  23  |  0.36<L>    Ca    9.0      05 Oct 2023 07:26  Phos  3.1     10-05  Mg     1.7     10-05    TPro  6.8  /  Alb  3.0<L>  /  TBili  0.4  /  DBili  x   /  AST  38  /  ALT  36  /  AlkPhos  70  10-05    LIVER FUNCTIONS - ( 05 Oct 2023 07:26 )  Alb: 3.0 g/dL / Pro: 6.8 g/dL / ALK PHOS: 70 U/L / ALT: 36 U/L DA / AST: 38 U/L / GGT: x                   CAPILLARY BLOOD GLUCOSE      RADIOLOGY & ADDITIONAL TESTS:

## 2023-10-05 NOTE — PROGRESS NOTE ADULT - PROBLEM SELECTOR PLAN 1
-s/p 4L NS in ED   -advance diet as tolerated  -zofran PRN for n/v   currently patient unable to hold down diet,  started on clear liquid diet  patient on standing LR  d/c PRN opiods due to nausea  Started naltrexone -s/p 4L NS in ED   -advance diet as tolerated  -zofran PRN for n/v   currently patient unable to hold down diet,  started on clear liquid diet  patient on standing LR  d/c PRN opiods due to nausea  Started naltrexone  d/c naltrexone due to side effects

## 2023-10-05 NOTE — PROGRESS NOTE ADULT - PROBLEM SELECTOR PLAN 2
Currently CIWA 0  Ativan PRN  c/w folic acid and thiamine Chest pain radiating from chest to throat  likely alcoholic gastritis  started on protonix BID

## 2023-10-05 NOTE — PROGRESS NOTE ADULT - ATTENDING COMMENTS
Patient seen and examined earlier this afternoon    33 year old female from home with medical history of alcohol use disorder and necrotizing pancreatitis in 2017 presenting to ED for abdominal pain admitted to ICU for acute pancreatitis and hyperkalemia.    Patient downgraded to medical floor. Diet was advanced to regular but not taking anything oral except some water. Abdominal pain improved. No fever or cough. Admits to drinking heavily up to 4 to 5 days a week. Recently also used Cocaine. intermittently also admits using Marijuana. Lives with her 16 y/o Son.   Patient doing better although did not wanted to drink soup and only liquids this morning. Pain is controlled intermittently worse as per patient.     Patient admits to being depressed with periods of anxiety and to relieve her anxiety she takes shots of vodka; She has not seeken any help for her anxiety symptoms despite her awareness and acceptance of the problem. She is open to taking medications if needed as well as talking with a Behavioral specialist    Vital Signs Last 24 Hrs  T(C): 36.9 (05 Oct 2023 19:32), Max: 37.1 (04 Oct 2023 21:26)  T(F): 98.4 (05 Oct 2023 19:32), Max: 98.8 (04 Oct 2023 21:26)  HR: 100 (05 Oct 2023 19:32) (99 - 103)  BP: 139/94 (05 Oct 2023 19:32) (133/90 - 141/89)  RR: 18 (05 Oct 2023 19:32) (18 - 18)  SpO2: 99% (05 Oct 2023 19:32) (99% - 100%): room air      P/E:  Young female, comfortable in bed, NAD  Psych: AAO x3  Neuro: No gross focal deficits; Power and sensation intact  CVS: S1S2 present, regular, no edema  Resp: BLAE+, No wheeze or Rhonchi  GI: Soft, BS+, Mildly tender, non distended  Extr: No  calf tenderness B/L Lower extremities  Skin: Warm and moist without any rashes    Labs:                                 9.9    6.62  )-----------( 187      ( 05 Oct 2023 07:26 )             29.3     10-05    137  |  102  |  6<L>  ----------------------------<  108<H>  3.5   |  23  |  0.36<L>    Ca    9.0      05 Oct 2023 07:26  Phos  3.1     10-05  Mg     1.7     10-05    TPro  6.8  /  Alb  3.0<L>  /  TBili  0.4  /  DBili  x   /  AST  38  /  ALT  36  /  AlkPhos  70  10-05      US Abdomen Upper Quadrant Right (10.04.23 @ 10:22)    IMPRESSION: No cholelithiasis or acute cholecystitis.      D/D:  Major Depression with Anxiety state   Suspected Acute Pancreatitis from chronic alcohol use  Severe high anion gap metabolic acidosis likely from combination of alcoholic and starvation ketoacidosis. resolved  Atypical chest pain on admission likely Cocaine use related resolved  Acute Hyperkalemia resolved  Substance use disorder  DANUTA, likely prerenal due to dehydration and por oral intake resolved  Transaminitis likely from alcohol abuse chronic  Hypovolemic hyponatremia resolved  Leukocytosis likely reactive form dehydration    Plan:  Patient did not tolerate Zoloft plus Naltrexone taken together;   No RAPHAEL symptoms today ; d/w Patient kelvin resume one medication at a time and see which caused her symptoms  Hold Naltrexone; d/w Dr. Izaguirre; may try Zoloft bedtime and Zoloft can on initation cause GI symptoms which should resolve with continued use  Patient understands that these medications takes a week to 10 days to work  Patient also had  follow up and referral to outpatient Substance use Rehab and Behavioral Health referral; d/w MAUREEN Mallory  [facility] Sonogram negative for any Liver or Gall Bladder pathology;   Patient wishes to try solid food this afternoon as she was able to keep Soup down;   If remain stable and tolerate diet  D/C Home tomorrow  D/C CIWA no withdrawal symptoms; may continue Thiamine and Folate orally  Alcohol and substance use cessation counseled  Rest as per PGY1 above    Discussed with Patient at length  Discussed with PGY1 Dr. Antonio and PGY3 Dr. Quintero

## 2023-10-05 NOTE — PROGRESS NOTE ADULT - PROBLEM SELECTOR PLAN 3
Started on Sertraline Currently CIWA 0  Ativan PRN  c/w folic acid and thiamine  Patient amenable to AA meetings, will think about outpatient rehab

## 2023-10-06 ENCOUNTER — TRANSCRIPTION ENCOUNTER (OUTPATIENT)
Age: 33
End: 2023-10-06

## 2023-10-06 VITALS
OXYGEN SATURATION: 100 % | SYSTOLIC BLOOD PRESSURE: 117 MMHG | RESPIRATION RATE: 18 BRPM | HEART RATE: 86 BPM | TEMPERATURE: 98 F | DIASTOLIC BLOOD PRESSURE: 81 MMHG

## 2023-10-06 LAB
ALBUMIN SERPL ELPH-MCNC: 3 G/DL — LOW (ref 3.5–5)
ALP SERPL-CCNC: 70 U/L — SIGNIFICANT CHANGE UP (ref 40–120)
ALT FLD-CCNC: 34 U/L DA — SIGNIFICANT CHANGE UP (ref 10–60)
ANION GAP SERPL CALC-SCNC: 7 MMOL/L — SIGNIFICANT CHANGE UP (ref 5–17)
AST SERPL-CCNC: 33 U/L — SIGNIFICANT CHANGE UP (ref 10–40)
BILIRUB SERPL-MCNC: 0.2 MG/DL — SIGNIFICANT CHANGE UP (ref 0.2–1.2)
BUN SERPL-MCNC: 9 MG/DL — SIGNIFICANT CHANGE UP (ref 7–18)
CALCIUM SERPL-MCNC: 9.1 MG/DL — SIGNIFICANT CHANGE UP (ref 8.4–10.5)
CHLORIDE SERPL-SCNC: 102 MMOL/L — SIGNIFICANT CHANGE UP (ref 96–108)
CO2 SERPL-SCNC: 27 MMOL/L — SIGNIFICANT CHANGE UP (ref 22–31)
CREAT SERPL-MCNC: 0.62 MG/DL — SIGNIFICANT CHANGE UP (ref 0.5–1.3)
EGFR: 120 ML/MIN/1.73M2 — SIGNIFICANT CHANGE UP
GLUCOSE SERPL-MCNC: 136 MG/DL — HIGH (ref 70–99)
HCT VFR BLD CALC: 31.2 % — LOW (ref 34.5–45)
HGB BLD-MCNC: 10.5 G/DL — LOW (ref 11.5–15.5)
MAGNESIUM SERPL-MCNC: 1.6 MG/DL — SIGNIFICANT CHANGE UP (ref 1.6–2.6)
MCHC RBC-ENTMCNC: 33.1 PG — SIGNIFICANT CHANGE UP (ref 27–34)
MCHC RBC-ENTMCNC: 33.7 GM/DL — SIGNIFICANT CHANGE UP (ref 32–36)
MCV RBC AUTO: 98.4 FL — SIGNIFICANT CHANGE UP (ref 80–100)
NRBC # BLD: 0 /100 WBCS — SIGNIFICANT CHANGE UP (ref 0–0)
PHOSPHATE SERPL-MCNC: 3.7 MG/DL — SIGNIFICANT CHANGE UP (ref 2.5–4.5)
PLATELET # BLD AUTO: 255 K/UL — SIGNIFICANT CHANGE UP (ref 150–400)
POTASSIUM SERPL-MCNC: 3.4 MMOL/L — LOW (ref 3.5–5.3)
POTASSIUM SERPL-SCNC: 3.4 MMOL/L — LOW (ref 3.5–5.3)
PROT SERPL-MCNC: 6.8 G/DL — SIGNIFICANT CHANGE UP (ref 6–8.3)
RBC # BLD: 3.17 M/UL — LOW (ref 3.8–5.2)
RBC # FLD: 17.2 % — HIGH (ref 10.3–14.5)
SODIUM SERPL-SCNC: 136 MMOL/L — SIGNIFICANT CHANGE UP (ref 135–145)
WBC # BLD: 7.51 K/UL — SIGNIFICANT CHANGE UP (ref 3.8–10.5)
WBC # FLD AUTO: 7.51 K/UL — SIGNIFICANT CHANGE UP (ref 3.8–10.5)

## 2023-10-06 PROCEDURE — 96374 THER/PROPH/DIAG INJ IV PUSH: CPT

## 2023-10-06 PROCEDURE — 82803 BLOOD GASES ANY COMBINATION: CPT

## 2023-10-06 PROCEDURE — 85027 COMPLETE CBC AUTOMATED: CPT

## 2023-10-06 PROCEDURE — 84132 ASSAY OF SERUM POTASSIUM: CPT

## 2023-10-06 PROCEDURE — 72131 CT LUMBAR SPINE W/O DYE: CPT | Mod: MG

## 2023-10-06 PROCEDURE — 84295 ASSAY OF SERUM SODIUM: CPT

## 2023-10-06 PROCEDURE — 71250 CT THORAX DX C-: CPT | Mod: MA

## 2023-10-06 PROCEDURE — 82962 GLUCOSE BLOOD TEST: CPT

## 2023-10-06 PROCEDURE — 84100 ASSAY OF PHOSPHORUS: CPT

## 2023-10-06 PROCEDURE — 85730 THROMBOPLASTIN TIME PARTIAL: CPT

## 2023-10-06 PROCEDURE — 99285 EMERGENCY DEPT VISIT HI MDM: CPT

## 2023-10-06 PROCEDURE — 85025 COMPLETE CBC W/AUTO DIFF WBC: CPT

## 2023-10-06 PROCEDURE — 36415 COLL VENOUS BLD VENIPUNCTURE: CPT

## 2023-10-06 PROCEDURE — 87640 STAPH A DNA AMP PROBE: CPT

## 2023-10-06 PROCEDURE — 83735 ASSAY OF MAGNESIUM: CPT

## 2023-10-06 PROCEDURE — 94640 AIRWAY INHALATION TREATMENT: CPT

## 2023-10-06 PROCEDURE — 96376 TX/PRO/DX INJ SAME DRUG ADON: CPT

## 2023-10-06 PROCEDURE — 76705 ECHO EXAM OF ABDOMEN: CPT

## 2023-10-06 PROCEDURE — 80307 DRUG TEST PRSMV CHEM ANLYZR: CPT

## 2023-10-06 PROCEDURE — 82330 ASSAY OF CALCIUM: CPT

## 2023-10-06 PROCEDURE — 74176 CT ABD & PELVIS W/O CONTRAST: CPT | Mod: MA

## 2023-10-06 PROCEDURE — 99239 HOSP IP/OBS DSCHRG MGMT >30: CPT | Mod: GC

## 2023-10-06 PROCEDURE — 83930 ASSAY OF BLOOD OSMOLALITY: CPT

## 2023-10-06 PROCEDURE — 84702 CHORIONIC GONADOTROPIN TEST: CPT

## 2023-10-06 PROCEDURE — 93005 ELECTROCARDIOGRAM TRACING: CPT

## 2023-10-06 PROCEDURE — 83605 ASSAY OF LACTIC ACID: CPT

## 2023-10-06 PROCEDURE — 83690 ASSAY OF LIPASE: CPT

## 2023-10-06 PROCEDURE — 87641 MR-STAPH DNA AMP PROBE: CPT

## 2023-10-06 PROCEDURE — 81001 URINALYSIS AUTO W/SCOPE: CPT

## 2023-10-06 PROCEDURE — 80053 COMPREHEN METABOLIC PANEL: CPT

## 2023-10-06 PROCEDURE — 85610 PROTHROMBIN TIME: CPT

## 2023-10-06 PROCEDURE — 96375 TX/PRO/DX INJ NEW DRUG ADDON: CPT

## 2023-10-06 PROCEDURE — G1004: CPT

## 2023-10-06 PROCEDURE — 80048 BASIC METABOLIC PNL TOTAL CA: CPT

## 2023-10-06 PROCEDURE — 82009 KETONE BODYS QUAL: CPT

## 2023-10-06 RX ORDER — POTASSIUM CHLORIDE 20 MEQ
40 PACKET (EA) ORAL EVERY 4 HOURS
Refills: 0 | Status: COMPLETED | OUTPATIENT
Start: 2023-10-06 | End: 2023-10-06

## 2023-10-06 RX ORDER — PANTOPRAZOLE SODIUM 20 MG/1
1 TABLET, DELAYED RELEASE ORAL
Qty: 30 | Refills: 0
Start: 2023-10-06 | End: 2023-11-04

## 2023-10-06 RX ORDER — FOLIC ACID 0.8 MG
1 TABLET ORAL
Qty: 30 | Refills: 0
Start: 2023-10-06 | End: 2023-11-04

## 2023-10-06 RX ORDER — SERTRALINE 25 MG/1
1 TABLET, FILM COATED ORAL
Qty: 30 | Refills: 0
Start: 2023-10-06 | End: 2023-11-04

## 2023-10-06 RX ORDER — PANTOPRAZOLE SODIUM 20 MG/1
1 TABLET, DELAYED RELEASE ORAL
Qty: 60 | Refills: 0
Start: 2023-10-06 | End: 2023-11-04

## 2023-10-06 RX ADMIN — Medication 100 MILLIGRAM(S): at 11:11

## 2023-10-06 RX ADMIN — Medication 15 MILLIGRAM(S): at 02:51

## 2023-10-06 RX ADMIN — PANTOPRAZOLE SODIUM 40 MILLIGRAM(S): 20 TABLET, DELAYED RELEASE ORAL at 05:31

## 2023-10-06 RX ADMIN — Medication 1 MILLIGRAM(S): at 11:11

## 2023-10-06 RX ADMIN — Medication 15 MILLIGRAM(S): at 11:11

## 2023-10-06 RX ADMIN — PANTOPRAZOLE SODIUM 40 MILLIGRAM(S): 20 TABLET, DELAYED RELEASE ORAL at 18:43

## 2023-10-06 RX ADMIN — Medication 1 TABLET(S): at 11:10

## 2023-10-06 RX ADMIN — Medication 15 MILLIGRAM(S): at 04:39

## 2023-10-06 RX ADMIN — Medication 40 MILLIEQUIVALENT(S): at 09:08

## 2023-10-06 NOTE — DISCHARGE NOTE PROVIDER - NSDCCPCAREPLAN_GEN_ALL_CORE_FT
PRINCIPAL DISCHARGE DIAGNOSIS  Diagnosis: Acute necrotizing pancreatitis  Assessment and Plan of Treatment:       SECONDARY DISCHARGE DIAGNOSES  Diagnosis: Alcohol abuse  Assessment and Plan of Treatment:     Diagnosis: Major depression  Assessment and Plan of Treatment:      PRINCIPAL DISCHARGE DIAGNOSIS  Diagnosis: Acute necrotizing pancreatitis  Assessment and Plan of Treatment: You presented with abdominal pain  PLEASE STOP DRINKING ALCOHOL, AS THIS IS CAUSING YOUR PANCREATITIS AND ABDOMINAL PAIN.      SECONDARY DISCHARGE DIAGNOSES  Diagnosis: Alcohol abuse  Assessment and Plan of Treatment: PLEASE CONTINUE TAKING MULTIVITAMIN TABLETS DAILY AND FOLIC ACID 1MG .    Diagnosis: Major depression  Assessment and Plan of Treatment: PLEASE CONTINUE TAKING ZOLOFT 50MG NIGHTLY AND FOLLOW UP WITH MARYAN PEÑA.     PRINCIPAL DISCHARGE DIAGNOSIS  Diagnosis: Acute pancreatitis  Assessment and Plan of Treatment: You presented to the emergency department with abdominal pain. You were admitted to the ICU to manage inflammation of your pancreas. You were not able to eat so you were given IV fluids and your abdominal pain was severe so you were given opiod pain killers.  PLEASE STOP DRINKING ALCOHOL, AS THIS IS CAUSING YOUR PANCREATITIS AND ABDOMINAL PAIN. You are advised to FOLLOW UP IN 1-2 WEEKS with your PRIMARY CARE PHYSICIAN.      SECONDARY DISCHARGE DIAGNOSES  Diagnosis: Alcohol abuse  Assessment and Plan of Treatment: Due to your alcohol use, you are predispsod to multiple vitamin deficiencies. You were prescribed MULTIVITAMINS and FOLIC ACID. PLEASE CONTINUE TAKING MULTIVITAMIN TABLETS DAILY AND FOLIC ACID 1MG . You are advised to FOLLOW UP IN 1-2 WEEKS with your PRIMARY CARE PHYSICIAN.    Diagnosis: Major depression  Assessment and Plan of Treatment: You were diagnosed with depression with anxiety. You were started on an antidepressant known as ZOLOFT. PLEASE CONTINUE TAKING ZOLOFT 50MG NIGHTLY AND FOLLOW UP WITH MARYAN PEÑA.    Diagnosis: Gastritis  Assessment and Plan of Treatment: You were experiencing pain in your chest and throat that was due to inflammation of your stomach and throat. This is due to increased acidity of your stomach secondary to your alcohol use. You were prescribed PANTOPRAZOLE ONCE DAILY FOR 30 DAYS and MAALOX 30ML EVERY 6 HOURS AS NEEDED to decrease the acidity. You are advised to continue taking the medications as prescribed. You are advised to FOLLOW UP IN 1-2 WEEKS with your PRIMARY CARE PHYSICIAN.     PRINCIPAL DISCHARGE DIAGNOSIS  Diagnosis: Acute pancreatitis  Assessment and Plan of Treatment: You presented to the emergency department with abdominal pain. You were admitted to the ICU to manage inflammation of your pancreas. You were not able to eat so you were given IV fluids and your abdominal pain was severe so you were given opiod pain killers. You were transitioned to a clear liquid diet which you tolerated better. Once you were downgraded from the ICU strong pain medications were discontinued and your pain was well controlled on tylenol and toradol. You are now able to tolerate a diet, and thus ready for discharge.  PLEASE STOP DRINKING ALCOHOL, AS THIS IS CAUSING YOUR PANCREATITIS AND ABDOMINAL PAIN. You are advised to FOLLOW UP IN 1-2 WEEKS with your PRIMARY CARE PHYSICIAN.      SECONDARY DISCHARGE DIAGNOSES  Diagnosis: Alcohol abuse  Assessment and Plan of Treatment: Due to your alcohol use, you were started on ativan taper to avoid any alcohol withdrawal symptoms. You did not have any signs of alcohol withdrawal, thus the ativan was discontinued. You are predispsod to multiple vitamin deficiencies due to your abuse of alcohol. You were prescribed MULTIVITAMINS and FOLIC ACID. PLEASE CONTINUE TAKING MULTIVITAMIN TABLETS DAILY AND FOLIC ACID 1MG . You are advised to FOLLOW UP IN 1-2 WEEKS with your PRIMARY CARE PHYSICIAN.    Diagnosis: Major depression  Assessment and Plan of Treatment: You were diagnosed with depression with anxiety. You were started on an antidepressant known as ZOLOFT. PLEASE CONTINUE TAKING ZOLOFT 50MG NIGHTLY AND FOLLOW UP WITH MARYAN PEÑA.    Diagnosis: Gastritis  Assessment and Plan of Treatment: You were experiencing pain in your chest and throat that was due to inflammation of your stomach and throat. This is due to increased acidity of your stomach secondary to your alcohol use. You were prescribed PANTOPRAZOLE ONCE DAILY FOR 30 DAYS and MAALOX 30ML EVERY 6 HOURS AS NEEDED for any abdominal discomfort you may have. You are advised to continue taking the medications as prescribed. You are advised to FOLLOW UP IN 1-2 WEEKS with your PRIMARY CARE PHYSICIAN.     PRINCIPAL DISCHARGE DIAGNOSIS  Diagnosis: Acute alcoholic gastritis without hemorrhage  Assessment and Plan of Treatment: You presented to the emergency department with abdominal pain., nausea and vomiting. You was admitted to ICU for severe electrolyte derangements with elevated potassium and low bicarbonate likely Acidosis from not eating (Starvation ketoacidosis) . CT scan without contrast in ED showed mild inflammation over pancreas. Lipase and enzyme released from Pancreas was nearly normal. You also had intermittent chest discomfort. You was treated with intravenous medication for Acid reflux and sotmach inflammation.    You was given vigorous hydration with IV fluids and your abdominal pain was managed with opiate meedications.  You were slowly transitioned to a clear liquid diet which you tolerated. Once you were downgraded from the ICU strong pain medications were discontinued and your pain was well controlled on tylenol and toradol. You are now able to tolerate a regualr diet, and thus ready for discharge.  You likely had Acute Alcoholic Gastritis with some superimposed mild pancreatitis possibly. Continue taking PPI (Protonix) daily before breakfast x 4 weeks and then slowly taper off Protonix.   You have been counseled on Alcohol cessation  PLEASE STOP DRINKING ALCOHOL, AS THIS IS LIKELY CONTRIBUTING TO GASTRITIS AND POSSIBLE PANCREATITIS AND ABDOMINAL PAIN. You are advised to FOLLOW UP IN 1-2 WEEKS with your PRIMARY CARE PHYSICIAN.   You reported having no PCP. We offered you possible PCP referal in [facility]. You prefer a PCP in near your home in [facility]. You are adviseed to call your Insurance provider and request a provider (PCP)  at par with your insurance.      SECONDARY DISCHARGE DIAGNOSES  Diagnosis: Alcohol abuse  Assessment and Plan of Treatment: Due to your alcohol use, you were started on ativan taper to avoid any alcohol withdrawal symptoms. You did not have any signs of alcohol withdrawal, thus the ativan was discontinued. You are predispsod to multiple vitamin deficiencies due to your continued use of alcohol. You were prescribed MULTIVITAMINS and FOLIC ACID. PLEASE CONTINUE TAKING MULTIVITAMIN TABLETS DAILY AND FOLIC ACID . You are advised to FOLLOW UP IN 1-2 WEEKS with your PRIMARY CARE PHYSICIAN.  PLEASE NOTE THAT CONTINUED ALCOHOL USE CAN LEAD TO COMPLICATIONS LIKE PANCREATITIS WHICH SOMETIMES CAN BE DANGEROUS AS WELL AS CHRONIC COMPLICATIONS LIKE CIRRHOSIS .    Diagnosis: Electrolyte imbalance  Assessment and Plan of Treatment:     Diagnosis: Major depression  Assessment and Plan of Treatment: You admitted to alcohol use due to significant anxiety and every time you feel anxious would drink. You agreed with behavioral specialist evaluation. You were diagnosed with Depression with Anxiety. You were started on an antidepressant known as Sertraline (ZOLOFT) and Naltrexone together which you did not tolerate well. We discontinued Naltrexone and gave only Zoloft at bedtime which you tolerated well.    PLEASE CONTINUE TAKING ZOLOFT 50MG NIGHTLY AND FOLLOW UP WITH [facility] OR ANOTHER PROVIDER FROM REFERRAL PROVIDED TO YOU BY .   Please note that these medications take few days to a couple of weeks to work.    Diagnosis: History of substance use  Assessment and Plan of Treatment: You also admitted to intermittent use of Cocaine and Marijuana. Please note that these substances has undue side effects. You are counsled on stay away form any illicit drug use to improve your health and prevent complications.    Diagnosis: Gastritis  Assessment and Plan of Treatment: You were experiencing pain in your chest and throat that was due to inflammation of your stomach and throat. This is due to increased acidity of your stomach secondary to your alcohol use. You were prescribed PANTOPRAZOLE ONCE DAILY FOR 30 DAYS and MAALOX 30ML EVERY 6 HOURS ONLY AS NEEDED for any abdominal discomfort you may have. You are advised to continue taking the medications as prescribed. You are advised to FOLLOW UP IN 1-2 WEEKS with your PRIMARY CARE PHYSICIAN.     PRINCIPAL DISCHARGE DIAGNOSIS  Diagnosis: Acute alcoholic gastritis without hemorrhage  Assessment and Plan of Treatment: You presented to the emergency department with abdominal pain., nausea and vomiting. You was admitted to ICU for severe electrolyte derangements with elevated potassium and low bicarbonate likely Acidosis from not eating (Starvation ketoacidosis) . CT scan without contrast in ED showed mild inflammation over pancreas. Lipase and enzyme released from Pancreas was nearly normal. You also had intermittent chest discomfort. You was treated with intravenous medication for Acid reflux and sotmach inflammation.    You was given vigorous hydration with IV fluids and your abdominal pain was managed with opiate meedications.  You were slowly transitioned to a clear liquid diet which you tolerated. Once you were downgraded from the ICU strong pain medications were discontinued and your pain was well controlled on tylenol and toradol. You are now able to tolerate a regualr diet, and thus ready for discharge.  You likely had Acute Alcoholic Gastritis with some superimposed mild pancreatitis possibly. Continue taking PPI (Protonix) daily before breakfast x 4 weeks and then slowly taper off Protonix.   You have been counseled on Alcohol cessation  PLEASE STOP DRINKING ALCOHOL, AS THIS IS LIKELY CONTRIBUTING TO GASTRITIS AND POSSIBLE PANCREATITIS AND ABDOMINAL PAIN. You are advised to FOLLOW UP IN 1-2 WEEKS with your PRIMARY CARE PHYSICIAN.   You reported having no PCP. We offered you possible PCP referal in [facility]. You prefer a PCP in near your home in [facility]. You are adviseed to call your Insurance provider and request a provider (PCP)  at par with your insurance.      SECONDARY DISCHARGE DIAGNOSES  Diagnosis: Alcohol abuse  Assessment and Plan of Treatment: Due to your alcohol use, you were started on ativan taper to avoid any alcohol withdrawal symptoms. You did not have any signs of alcohol withdrawal, thus the ativan was discontinued. You are predispsod to multiple vitamin deficiencies due to your continued use of alcohol. You were prescribed MULTIVITAMINS and FOLIC ACID. PLEASE CONTINUE TAKING MULTIVITAMIN TABLETS DAILY AND FOLIC ACID . You are advised to FOLLOW UP IN 1-2 WEEKS with your PRIMARY CARE PHYSICIAN.  PLEASE NOTE THAT CONTINUED ALCOHOL USE CAN LEAD TO COMPLICATIONS LIKE PANCREATITIS WHICH SOMETIMES CAN BE DANGEROUS AS WELL AS CHRONIC COMPLICATIONS LIKE CIRRHOSIS .    Diagnosis: Electrolyte imbalance  Assessment and Plan of Treatment: You was noted to have severe elevation in potassium as well as very low bicarbonate levels on admission for which you was admited to ICU. These derangements were corrected or replaced and all normalized    Diagnosis: Major depression  Assessment and Plan of Treatment: You admitted to alcohol use due to significant anxiety and every time you feel anxious would drink. You agreed with behavioral specialist evaluation. You were diagnosed with Depression with Anxiety. You were started on an antidepressant known as Sertraline (ZOLOFT) and Naltrexone together which you did not tolerate well. We discontinued Naltrexone and gave only Zoloft at bedtime which you tolerated well.    PLEASE CONTINUE TAKING ZOLOFT 50MG NIGHTLY AND FOLLOW UP WITH [facility] OR ANOTHER PROVIDER FROM REFERRAL PROVIDED TO YOU BY .   Please note that these medications take few days to a couple of weeks to work.    Diagnosis: History of substance use  Assessment and Plan of Treatment: You also admitted to intermittent use of Cocaine and Marijuana. Please note that these substances has undue side effects. You are counsled on stay away form any illicit drug use to improve your health and prevent complications.    Diagnosis: Gastritis  Assessment and Plan of Treatment: You were experiencing pain in your chest and throat that was due to inflammation of your stomach and throat. This is due to increased acidity of your stomach secondary to your alcohol use. You were prescribed PANTOPRAZOLE ONCE DAILY FOR 30 DAYS and MAALOX 30ML EVERY 6 HOURS ONLY AS NEEDED for any abdominal discomfort you may have. You are advised to continue taking the medications as prescribed. You are advised to FOLLOW UP IN 1-2 WEEKS with your PRIMARY CARE PHYSICIAN.    Diagnosis: DANUTA (acute kidney injury)  Assessment and Plan of Treatment: You was noted to have impaired kidney fucntion with creatinine 1.61; This was likely due to severe dehydration from poor oral intake an vomiting. You was given fluids with resolution of impaired kidney function. Encourage oral fluids

## 2023-10-06 NOTE — DISCHARGE NOTE PROVIDER - HOSPITAL COURSE
33 year old female from home with medical history of alcohol use disorder and necrotizing pancreatitis in 2013 and acute pancreatitis 2017, 2019 presenting to ED for abdominal pain admitted for acute pancreatitis and hyperkalemia.    ICU course   10/1 Hyperkalemia K 7.3, Hyperkalemia cocktail given. K+ normalized. HCO3 8. sodium bicarb 50mEq x2 given. No change. Patient put on bicarbonate drip. HCO3 normalized to 21. Bicarb drip was discontinued. Advanced diet to CLD. Hypomagnesemia, hypokalemia and hypophosphatemia developed likely d/t refeeding syndrome. Mag sulfate 2g given. Sodium phosphate 30 mmol given.10/2 Mag and potassium normalized. Phosphate low. Replenished with potassium phos x3.     Patient was downgraded from ICU complaining of abdominal pain and unable to tolerate diet, she was thus started on a full liquid diet. RUQ U/S was ordered showing hepatic steatosis and no cholecystitis or gall stones. Patient appeared depressed on assessment was thus seen by psychiatrist Dr. Izaguirre and started on zoloft 50mg qhs and naltrexone, patient did not tolerate naltrexone, thus continued on zoloft alone.    Given patient's improved clinical status and current hemodynamic stability, decision was made to discharge the patient.  Patient is stable for discharge per attending and is advised to follow up with PCP as outpatient  Please refer to patient's complete medical chart with documents for a full hospital course, for this is only a brief summary.

## 2023-10-06 NOTE — DISCHARGE NOTE PROVIDER - NSDCMRMEDTOKEN_GEN_ALL_CORE_FT
aluminum hydroxide-magnesium hydroxide 200 mg-200 mg/5 mL oral suspension: 30 milliliter(s) orally every 6 hours as needed for Dyspepsia  folic acid 1 mg oral tablet: 1 tab(s) orally once a day  Multiple Vitamins oral tablet: 1 tab(s) orally once a day  pantoprazole 40 mg oral delayed release tablet: 1 tab(s) orally 2 times a day  sertraline 50 mg oral tablet: 1 tab(s) orally once a day (at bedtime)   aluminum hydroxide-magnesium hydroxide 200 mg-200 mg/5 mL oral suspension: 30 milliliter(s) orally every 6 hours as needed for Dyspepsia  folic acid 1 mg oral tablet: 1 tab(s) orally once a day  Multiple Vitamins oral tablet: 1 tab(s) orally once a day  pantoprazole 40 mg oral delayed release tablet: 1 tab(s) orally once a day  sertraline 50 mg oral tablet: 1 tab(s) orally once a day (at bedtime)

## 2023-10-06 NOTE — DISCHARGE NOTE NURSING/CASE MANAGEMENT/SOCIAL WORK - PATIENT PORTAL LINK FT
You can access the FollowMyHealth Patient Portal offered by [facility] by registering at the following website: [email or web address]. By joining [employer]’s FollowMyHealth portal, you will also be able to view your health information using other applications (apps) compatible with our system.

## 2023-10-06 NOTE — DISCHARGE NOTE PROVIDER - NSDCFUADDINST_GEN_ALL_CORE_FT
PLEASE AVOID DAILY ALCOHOL USE; STAY AWAY FROM ILLICIT DRUG USE  EAT DINNER 2 TO 3 HOURS BEFORE BEDTIME

## 2023-10-06 NOTE — DISCHARGE NOTE PROVIDER - NSFOLLOWUPCLINICS_GEN_ALL_ED_FT
[facility]  Psychiatry  [address] [place] [address]  Phone: [phone]  Fax:

## 2023-10-06 NOTE — DISCHARGE NOTE NURSING/CASE MANAGEMENT/SOCIAL WORK - NSDCPEFALRISK_GEN_ALL_CORE
For information on Fall & Injury Prevention, visit: [email or web address] OR  [email or web address] OR  https://www.cdc.gov/steadi/patient.html

## 2024-01-22 ENCOUNTER — APPOINTMENT (OUTPATIENT)
Dept: MAMMOGRAPHY | Facility: CLINIC | Age: 34
End: 2024-01-22

## 2024-10-14 NOTE — ED ADULT NURSE NOTE - NSSEPSISSUSPECTED_ED_A_ED
Patient's mom called once more for medication to be sent to pharmacy. I advised we are awaiting culture results and will have Dr Quezada review medication request in meantime.     Called [facility]- results are still pending and not expected until 10/17.    No

## 2024-10-17 NOTE — PATIENT PROFILE ADULT - FUNCTIONAL SCREEN CURRENT LEVEL: SWALLOWING (IF SCORE 2 OR MORE FOR ANY ITEM, CONSULT REHAB SERVICES), MLM)
Diet, DASH/TLC:   Sodium & Cholesterol Restricted  Consistent Carbohydrate {Evening Snack} (CSTCHOSN) (10-15-24 @ 02:36) [Active]       Diet, DASH/TLC:   Sodium & Cholesterol Restricted  Consistent Carbohydrate {Evening Snack} (CSTCHOSN) (10-15-24 @ 02:36) [Active] 0 = swallows foods/liquids without difficulty

## 2024-11-20 NOTE — DISCHARGE NOTE ADULT - NURSING SECTION COMPLETE
[Right] : right foot and ankle [NL (40)] : plantar flexion 40 degrees [NL 30)] : inversion 30 degrees [NL (20)] : eversion 20 degrees [5___] : eversion 5[unfilled]/5 [2+] : dorsalis pedis pulse: 2+ [] : patient ambulates without assistive device [[id number]] : dorsiflexion 15 degrees Patient/Caregiver provided printed discharge information.